# Patient Record
Sex: FEMALE | Race: OTHER | Employment: OTHER | URBAN - METROPOLITAN AREA
[De-identification: names, ages, dates, MRNs, and addresses within clinical notes are randomized per-mention and may not be internally consistent; named-entity substitution may affect disease eponyms.]

---

## 2023-06-14 ENCOUNTER — TELEPHONE (OUTPATIENT)
Dept: PRIMARY CARE | Facility: CLINIC | Age: 73
End: 2023-06-14
Payer: COMMERCIAL

## 2023-06-14 DIAGNOSIS — E66.8 MODERATE OBESITY: ICD-10-CM

## 2023-06-15 ENCOUNTER — APPOINTMENT (OUTPATIENT)
Dept: PRIMARY CARE | Facility: CLINIC | Age: 73
End: 2023-06-15
Payer: COMMERCIAL

## 2023-06-19 ENCOUNTER — LAB (OUTPATIENT)
Dept: LAB | Facility: LAB | Age: 73
End: 2023-06-19
Payer: COMMERCIAL

## 2023-06-19 ENCOUNTER — OFFICE VISIT (OUTPATIENT)
Dept: PRIMARY CARE | Facility: CLINIC | Age: 73
End: 2023-06-19
Payer: COMMERCIAL

## 2023-06-19 VITALS — TEMPERATURE: 97.7 F | BODY MASS INDEX: 47.71 KG/M2 | HEIGHT: 60 IN | WEIGHT: 243 LBS

## 2023-06-19 DIAGNOSIS — E03.9 HYPOTHYROIDISM (ACQUIRED): ICD-10-CM

## 2023-06-19 DIAGNOSIS — L03.119 CELLULITIS OF LOWER EXTREMITY, UNSPECIFIED LATERALITY: ICD-10-CM

## 2023-06-19 DIAGNOSIS — E55.9 VITAMIN D DEFICIENCY: ICD-10-CM

## 2023-06-19 DIAGNOSIS — I10 BENIGN ESSENTIAL HYPERTENSION: ICD-10-CM

## 2023-06-19 DIAGNOSIS — M17.0 PRIMARY OSTEOARTHRITIS OF BOTH KNEES: ICD-10-CM

## 2023-06-19 DIAGNOSIS — I80.9 PHLEBITIS: Primary | ICD-10-CM

## 2023-06-19 DIAGNOSIS — E66.8 MODERATE OBESITY: ICD-10-CM

## 2023-06-19 DIAGNOSIS — M17.11 ARTHRITIS OF RIGHT KNEE: ICD-10-CM

## 2023-06-19 DIAGNOSIS — Z00.00 MEDICARE ANNUAL WELLNESS VISIT, SUBSEQUENT: ICD-10-CM

## 2023-06-19 PROBLEM — E66.9 MODERATE OBESITY: Status: ACTIVE | Noted: 2023-06-19

## 2023-06-19 PROBLEM — E78.5 HYPERLIPIDEMIA: Status: ACTIVE | Noted: 2023-06-19

## 2023-06-19 PROBLEM — G62.9 PERIPHERAL NEUROPATHY: Status: ACTIVE | Noted: 2023-06-19

## 2023-06-19 PROBLEM — L30.9 ECZEMA: Status: ACTIVE | Noted: 2023-06-19

## 2023-06-19 LAB
ALBUMIN (MG/L) IN URINE: 45.2 MG/L
ALBUMIN/CREATININE (UG/MG) IN URINE: 32.3 UG/MG CRT (ref 0–30)
BASOPHILS (10*3/UL) IN BLOOD BY AUTOMATED COUNT: 0.06 X10E9/L (ref 0–0.1)
BASOPHILS/100 LEUKOCYTES IN BLOOD BY AUTOMATED COUNT: 0.7 % (ref 0–2)
CREATININE (MG/DL) IN URINE: 140 MG/DL (ref 20–320)
EOSINOPHILS (10*3/UL) IN BLOOD BY AUTOMATED COUNT: 0.14 X10E9/L (ref 0–0.4)
EOSINOPHILS/100 LEUKOCYTES IN BLOOD BY AUTOMATED COUNT: 1.7 % (ref 0–6)
ERYTHROCYTE DISTRIBUTION WIDTH (RATIO) BY AUTOMATED COUNT: 12.3 % (ref 11.5–14.5)
ERYTHROCYTE MEAN CORPUSCULAR HEMOGLOBIN CONCENTRATION (G/DL) BY AUTOMATED: 30.2 G/DL (ref 32–36)
ERYTHROCYTE MEAN CORPUSCULAR VOLUME (FL) BY AUTOMATED COUNT: 98 FL (ref 80–100)
ERYTHROCYTES (10*6/UL) IN BLOOD BY AUTOMATED COUNT: 4.16 X10E12/L (ref 4–5.2)
ESTIMATED AVERAGE GLUCOSE FOR HBA1C: 97 MG/DL
HEMATOCRIT (%) IN BLOOD BY AUTOMATED COUNT: 40.7 % (ref 36–46)
HEMOGLOBIN (G/DL) IN BLOOD: 12.3 G/DL (ref 12–16)
HEMOGLOBIN A1C/HEMOGLOBIN TOTAL IN BLOOD: 5 %
IMMATURE GRANULOCYTES/100 LEUKOCYTES IN BLOOD BY AUTOMATED COUNT: 0.5 % (ref 0–0.9)
LEUKOCYTES (10*3/UL) IN BLOOD BY AUTOMATED COUNT: 8.1 X10E9/L (ref 4.4–11.3)
LYMPHOCYTES (10*3/UL) IN BLOOD BY AUTOMATED COUNT: 2.55 X10E9/L (ref 0.8–3)
LYMPHOCYTES/100 LEUKOCYTES IN BLOOD BY AUTOMATED COUNT: 31.6 % (ref 13–44)
MONOCYTES (10*3/UL) IN BLOOD BY AUTOMATED COUNT: 0.73 X10E9/L (ref 0.05–0.8)
MONOCYTES/100 LEUKOCYTES IN BLOOD BY AUTOMATED COUNT: 9 % (ref 2–10)
NEUTROPHILS (10*3/UL) IN BLOOD BY AUTOMATED COUNT: 4.56 X10E9/L (ref 1.6–5.5)
NEUTROPHILS/100 LEUKOCYTES IN BLOOD BY AUTOMATED COUNT: 56.5 % (ref 40–80)
NRBC (PER 100 WBCS) BY AUTOMATED COUNT: 0 /100 WBC (ref 0–0)
PLATELETS (10*3/UL) IN BLOOD AUTOMATED COUNT: 289 X10E9/L (ref 150–450)

## 2023-06-19 PROCEDURE — 1036F TOBACCO NON-USER: CPT | Performed by: INTERNAL MEDICINE

## 2023-06-19 PROCEDURE — 80061 LIPID PANEL: CPT

## 2023-06-19 PROCEDURE — 82570 ASSAY OF URINE CREATININE: CPT

## 2023-06-19 PROCEDURE — 85025 COMPLETE CBC W/AUTO DIFF WBC: CPT

## 2023-06-19 PROCEDURE — 99214 OFFICE O/P EST MOD 30 MIN: CPT | Performed by: INTERNAL MEDICINE

## 2023-06-19 PROCEDURE — 80053 COMPREHEN METABOLIC PANEL: CPT

## 2023-06-19 PROCEDURE — 1159F MED LIST DOCD IN RCRD: CPT | Performed by: INTERNAL MEDICINE

## 2023-06-19 PROCEDURE — 84443 ASSAY THYROID STIM HORMONE: CPT

## 2023-06-19 PROCEDURE — 82043 UR ALBUMIN QUANTITATIVE: CPT

## 2023-06-19 PROCEDURE — 1160F RVW MEDS BY RX/DR IN RCRD: CPT | Performed by: INTERNAL MEDICINE

## 2023-06-19 PROCEDURE — 83036 HEMOGLOBIN GLYCOSYLATED A1C: CPT

## 2023-06-19 PROCEDURE — 36415 COLL VENOUS BLD VENIPUNCTURE: CPT

## 2023-06-19 RX ORDER — FLUOCINONIDE 0.5 MG/G
OINTMENT TOPICAL 2 TIMES DAILY PRN
Qty: 60 G | Refills: 0 | Status: SHIPPED | OUTPATIENT
Start: 2023-06-19 | End: 2023-07-18 | Stop reason: ALTCHOICE

## 2023-06-19 RX ORDER — CEPHALEXIN 500 MG/1
500 CAPSULE ORAL 2 TIMES DAILY
Qty: 14 CAPSULE | Refills: 0 | Status: SHIPPED | OUTPATIENT
Start: 2023-06-19 | End: 2023-06-26

## 2023-06-19 RX ORDER — MELOXICAM 15 MG/1
15 TABLET ORAL DAILY
Qty: 30 TABLET | Refills: 11 | Status: SHIPPED | OUTPATIENT
Start: 2023-06-19 | End: 2023-07-18 | Stop reason: ALTCHOICE

## 2023-06-19 NOTE — PROGRESS NOTES
Subjective   Patient ID: Anna Freedmna is a 72 y.o. female who presents for Knee Pain (Right knee pain for the last few weeks ) and Shortness of Breath.    Assessment/Plan     Problem List Items Addressed This Visit          Circulatory    Benign essential hypertension     Patients BP readings reviewed and addressed, as we age our arteries turn stiffer and less elastic. Restricting salt consumption and staying physically fit with regular exercise regimen is the only way to keep our vasculature less tonic. Studies have shown that keeping ideal body wt, exercise routine about 140 to 150 minutes a week, eating variety of plant based diet and drinking plentiful water are quite helpful. Monitor BP twice or once a week at home and bring log to be reviewed by me. Uncontrolled BP has long term consequences including heart failure, myocardial infarction, accelerated atherosclerosis and kidney dysfunction. Therapy reviewed and explained.           Relevant Orders    Albumin , Urine Random    CBC and Auto Differential    Comprehensive Metabolic Panel    Hemoglobin A1C    Lipid Panel    TSH with reflex to Free T4 if abnormal       Musculoskeletal    Arthritis of right knee     Sent for the x-ray physical therapy Occupational Therapy follow-up         Cellulitis of lower extremity    Relevant Medications    fluocinonide (Lidex) 0.05 % ointment    cephalexin (Keflex) 500 mg capsule    meloxicam (Mobic) 15 mg tablet    Primary osteoarthritis of both knees    Relevant Medications    fluocinonide (Lidex) 0.05 % ointment    cephalexin (Keflex) 500 mg capsule    meloxicam (Mobic) 15 mg tablet       Endocrine/Metabolic    Hypothyroidism (acquired)     Endocrine work-up follow-up         Relevant Orders    Albumin , Urine Random    CBC and Auto Differential    Comprehensive Metabolic Panel    Hemoglobin A1C    Lipid Panel    TSH with reflex to Free T4 if abnormal    Moderate obesity     I spent <15 minutes face to face with individual  providing recommendations for nutrition choices and exercise plan to help achieve weight reduction goals. Obesity is systemic disorder and it can bring devastating morbidities in furture. It is a matter of calorie gain and loss, keeping bodybank in negative calorie balance mode is the way to sustain weight loss.Diet has a big role in reducing excess body wt. Scheduled and well planned meals and food intake with watchfulness and understanding of calorie portion and distribution is key to understand. Bariatric surgery is another option if sustained wt loss is not achieved and faced with one or more comorbidities with morbid obesity. Weigh yourself twice a week to understand and follow wt loss goals.           Vitamin D deficiency    Relevant Orders    Albumin , Urine Random    CBC and Auto Differential    Comprehensive Metabolic Panel    Hemoglobin A1C    Lipid Panel    TSH with reflex to Free T4 if abnormal       Infectious/Inflammatory    Phlebitis - Primary     Phlebitis cellulitis given patient Keflex lido skin locally Dyazide referred to the vein clinic         Relevant Medications    fluocinonide (Lidex) 0.05 % ointment    cephalexin (Keflex) 500 mg capsule    meloxicam (Mobic) 15 mg tablet       HPI 72-year-old patient who had a history of obesity hypertension hyperlipidemia metabolic syndrome recently came from [place] complaining of the lower extremity edema phlebitis cellulitis onset acutely duration 1 week progressed slowly aggravating factor underlying immunocompromised host complaint acute on chronic both knee pain more on the right compared to left    Negative for headache chest pain hematuria rectal bleeding.    No Known Allergies    Current Outpatient Medications   Medication Sig Dispense Refill    cephalexin (Keflex) 500 mg capsule Take 1 capsule (500 mg) by mouth 2 times a day for 7 days. 14 capsule 0    fluocinonide (Lidex) 0.05 % ointment Apply topically 2 times a day as needed for irritation or  rash. Avoid face and groin. 60 g 0    meloxicam (Mobic) 15 mg tablet Take 1 tablet (15 mg) by mouth once daily. 30 tablet 11     No current facility-administered medications for this visit.       Objective   Visit Vitals  Temp 36.5 °C (97.7 °F)   Ht 1.524 m (5')   Wt 110 kg (243 lb)   BMI 47.46 kg/m²   Smoking Status Never   BSA 2.16 m²     Physical Exam  Constitutional:       General: He is not in acute distress.     Appearance: Normal appearance.  Obesity  HENT:      Head: Normocephalic and atraumatic.      Nose: Nose normal.   Eyes:      Extraocular Movements: Extraocular movements intact.      Conjunctiva/sclera: Conjunctivae normal.   Cardiovascular:      Rate and Rhythm: Normal rate and regular rhythm.   Pulmonary:      Effort: Pulmonary effort is normal.  Crackles rhonchi     Breath sounds: Normal breath sounds.   Skin:     General: Skin is warm.  Phlebitis cellulitis lower extremity more on the right compared to left  Neurological:      Mental Status: He is alert and oriented to person, place, and time.  Neuralgia  Psychiatric:         Mood and Affect: Mood normal.         Behavior: Behavior normal.  Per DSM 5 (2013): Major Depressive Disorder 5+ symptoms must be present consistently nearly every day in a 2 week period and illustrate a deviation from baseline:      Depressed mood most of the day*    Markedly diminished interest or pleasure in all/nearly all activities most days*    Significant unintended weight loss (or weight gain)    Insomnia or hypersomnia    Psychomotor agitation or retardation    Fatigue or energy loss    Feeling of worthlessness or excessive or inappropriate guilt    Diminished ability to think, concentrate or indecisiveness    Recurrent thoughts of death, suicidal ideation without a specific plan, suicide attempt, or specific suicidal plan     *One of the symptoms must be depressed mood or loss of interest/pleasure.   Suicidal Ideation and suicidal behaviors    If concern for suicidal  risk refer to mental health professional and arrange emergency transport if indicated    If concern for bipolar disorder, refer to mental health professional      PHQ9 Score- Compare and contrast previous PHQ9 scores at each visit to previous PHQ9    It is important to observe a trend in the decrease of the score    To calculate % Change PHQ9 total score from baseline to current visit: (Baseline score minus current score)/ divided by baseline score    Pay attention to question 9 for suicidal risk      Symptoms of depression      Change from patientbaseline     Side Effect Tolerability      Patient Feedback      Anticholinergic    Drowsiness    Insomnia/agitation    Orthostatic hypotension    QTc Prolongation    Gastrointestinal toxicity    Weight gain    Sexual dysfunction     If remission is achieved continue the same treatment including the same medication(s) dose for 6-12 months from the first major depressive episode to prevent relapse.    Exercise    Sleep hygiene    Healthy diet   Stress management   Light therapy for seasonal affective disorder    Meditation/Mindfulness   Continue current medication if no better advised to get a psych evaluation psychotherapies and psychiatric M.D. peripheral name and number was given to patient  Advised to follow-up every 3-4 months and check PHQ  GOAL= improve quality of life /PHQ LESS THAN 4    Musculoskeletal system advanced arthritis of the both knee  There is no immunization history on file for this patient.    Review of Systems    No visits with results within 4 Month(s) from this visit.   Latest known visit with results is:   [facility] Encounter on 11/27/2019   Component Date Value Ref Range Status    TSH 11/27/2019 1.83  0.44 - 3.98 mIU/L Final    Vitamin D, 25-Hydroxy 11/27/2019 20 (A)  ng/mL Final    Cholesterol 11/27/2019 153  0 - 199 mg/dL Final    HDL 11/27/2019 72.6  mg/dL Final    Cholesterol/HDL Ratio 11/27/2019 2.1   Final    LDL 11/27/2019 64  0 - 99 mg/dL  Final    VLDL 11/27/2019 16  0 - 40 mg/dL Final    Triglycerides 11/27/2019 80  0 - 149 mg/dL Final       Radiology: Reviewed imaging in powerchart.  No results found.    Family History   Problem Relation Name Age of Onset    No Known Problems Mother      No Known Problems Father       Social History     Socioeconomic History    Marital status:      Spouse name: None    Number of children: None    Years of education: None    Highest education level: None   Occupational History    None   Tobacco Use    Smoking status: Never    Smokeless tobacco: Never   Substance and Sexual Activity    Alcohol use: Never    Drug use: Never    Sexual activity: None   Other Topics Concern    None   Social History Narrative    None     Social Determinants of Health     Financial Resource Strain: Not on file   Food Insecurity: Not on file   Transportation Needs: Not on file   Physical Activity: Not on file   Stress: Not on file   Social Connections: Not on file   Intimate Partner Violence: Not on file   Housing Stability: Not on file     History reviewed. No pertinent past medical history.  Past Surgical History:   Procedure Laterality Date    TOTAL KNEE ARTHROPLASTY  03/22/2016    Knee Replacement       Charting was completed using voice recognition technology and may include unintended errors.

## 2023-06-20 LAB
ALANINE AMINOTRANSFERASE (SGPT) (U/L) IN SER/PLAS: 4 U/L (ref 7–45)
ALBUMIN (G/DL) IN SER/PLAS: 4.4 G/DL (ref 3.4–5)
ALKALINE PHOSPHATASE (U/L) IN SER/PLAS: 68 U/L (ref 33–136)
ANION GAP IN SER/PLAS: 13 MMOL/L (ref 10–20)
ASPARTATE AMINOTRANSFERASE (SGOT) (U/L) IN SER/PLAS: 12 U/L (ref 9–39)
BILIRUBIN TOTAL (MG/DL) IN SER/PLAS: 0.5 MG/DL (ref 0–1.2)
CALCIUM (MG/DL) IN SER/PLAS: 9.9 MG/DL (ref 8.6–10.6)
CARBON DIOXIDE, TOTAL (MMOL/L) IN SER/PLAS: 29 MMOL/L (ref 21–32)
CHLORIDE (MMOL/L) IN SER/PLAS: 105 MMOL/L (ref 98–107)
CHOLESTEROL (MG/DL) IN SER/PLAS: 198 MG/DL (ref 0–199)
CHOLESTEROL IN HDL (MG/DL) IN SER/PLAS: 62.2 MG/DL
CHOLESTEROL/HDL RATIO: 3.2
CREATININE (MG/DL) IN SER/PLAS: 0.99 MG/DL (ref 0.5–1.05)
GFR FEMALE: 60 ML/MIN/1.73M2
GLUCOSE (MG/DL) IN SER/PLAS: 70 MG/DL (ref 74–99)
LDL: 116 MG/DL (ref 0–99)
POTASSIUM (MMOL/L) IN SER/PLAS: 4.8 MMOL/L (ref 3.5–5.3)
PROTEIN TOTAL: 7.5 G/DL (ref 6.4–8.2)
SODIUM (MMOL/L) IN SER/PLAS: 142 MMOL/L (ref 136–145)
THYROTROPIN (MIU/L) IN SER/PLAS BY DETECTION LIMIT <= 0.05 MIU/L: 3.84 MIU/L (ref 0.44–3.98)
TRIGLYCERIDE (MG/DL) IN SER/PLAS: 101 MG/DL (ref 0–149)
UREA NITROGEN (MG/DL) IN SER/PLAS: 18 MG/DL (ref 6–23)
VLDL: 20 MG/DL (ref 0–40)

## 2023-06-20 NOTE — TELEPHONE ENCOUNTER
----- Message from Mari Gooden MD sent at 6/20/2023  9:18 AM EDT -----  Adipex 1 a day #30 follow-up 4 weeks 1800-calorie diet

## 2023-06-23 ENCOUNTER — TELEPHONE (OUTPATIENT)
Dept: PRIMARY CARE | Facility: CLINIC | Age: 73
End: 2023-06-23
Payer: COMMERCIAL

## 2023-06-23 RX ORDER — PHENTERMINE HYDROCHLORIDE 37.5 MG/1
37.5 TABLET ORAL
Qty: 30 TABLET | Refills: 0 | Status: SHIPPED | OUTPATIENT
Start: 2023-06-23 | End: 2023-07-18 | Stop reason: ALTCHOICE

## 2023-06-23 NOTE — TELEPHONE ENCOUNTER
----- Message from Mari Gooden MD sent at 6/23/2023 10:41 AM EDT -----  High LDL low blood sugar advised follow-up next week bring all medication with you

## 2023-06-26 NOTE — TELEPHONE ENCOUNTER
----- Message from Mari Gooden MD sent at 6/26/2023  2:05 PM EDT -----  Severe arthritis advised to see Dr. Dougie Bauman for possible surgery

## 2023-07-18 ENCOUNTER — OFFICE VISIT (OUTPATIENT)
Dept: PRIMARY CARE | Facility: CLINIC | Age: 73
End: 2023-07-18
Payer: COMMERCIAL

## 2023-07-18 ENCOUNTER — LAB (OUTPATIENT)
Dept: LAB | Facility: LAB | Age: 73
End: 2023-07-18
Payer: COMMERCIAL

## 2023-07-18 VITALS
OXYGEN SATURATION: 96 % | HEART RATE: 72 BPM | TEMPERATURE: 97.5 F | BODY MASS INDEX: 47.46 KG/M2 | DIASTOLIC BLOOD PRESSURE: 81 MMHG | SYSTOLIC BLOOD PRESSURE: 131 MMHG | HEIGHT: 60 IN

## 2023-07-18 DIAGNOSIS — E55.9 VITAMIN D DEFICIENCY: ICD-10-CM

## 2023-07-18 DIAGNOSIS — E66.8 MODERATE OBESITY: ICD-10-CM

## 2023-07-18 DIAGNOSIS — M81.0 AGE-RELATED OSTEOPOROSIS WITHOUT CURRENT PATHOLOGICAL FRACTURE: ICD-10-CM

## 2023-07-18 DIAGNOSIS — I10 BENIGN ESSENTIAL HYPERTENSION: ICD-10-CM

## 2023-07-18 DIAGNOSIS — E78.2 MIXED HYPERLIPIDEMIA: ICD-10-CM

## 2023-07-18 DIAGNOSIS — M25.561 CHRONIC PAIN OF RIGHT KNEE: ICD-10-CM

## 2023-07-18 DIAGNOSIS — E03.9 HYPOTHYROIDISM (ACQUIRED): ICD-10-CM

## 2023-07-18 DIAGNOSIS — G89.29 CHRONIC PAIN OF RIGHT KNEE: ICD-10-CM

## 2023-07-18 DIAGNOSIS — I10 BENIGN ESSENTIAL HYPERTENSION: Primary | ICD-10-CM

## 2023-07-18 DIAGNOSIS — M25.561 RIGHT KNEE PAIN, UNSPECIFIED CHRONICITY: ICD-10-CM

## 2023-07-18 LAB
ALANINE AMINOTRANSFERASE (SGPT) (U/L) IN SER/PLAS: 5 U/L (ref 7–45)
ALBUMIN (G/DL) IN SER/PLAS: 4.3 G/DL (ref 3.4–5)
ALKALINE PHOSPHATASE (U/L) IN SER/PLAS: 70 U/L (ref 33–136)
ANION GAP IN SER/PLAS: 11 MMOL/L (ref 10–20)
ASPARTATE AMINOTRANSFERASE (SGOT) (U/L) IN SER/PLAS: 15 U/L (ref 9–39)
BILIRUBIN TOTAL (MG/DL) IN SER/PLAS: 0.4 MG/DL (ref 0–1.2)
CALCIUM (MG/DL) IN SER/PLAS: 9.7 MG/DL (ref 8.6–10.6)
CARBON DIOXIDE, TOTAL (MMOL/L) IN SER/PLAS: 29 MMOL/L (ref 21–32)
CHLORIDE (MMOL/L) IN SER/PLAS: 102 MMOL/L (ref 98–107)
CREATININE (MG/DL) IN SER/PLAS: 1.04 MG/DL (ref 0.5–1.05)
GFR FEMALE: 56 ML/MIN/1.73M2
GLUCOSE (MG/DL) IN SER/PLAS: 115 MG/DL (ref 74–99)
POTASSIUM (MMOL/L) IN SER/PLAS: 5.3 MMOL/L (ref 3.5–5.3)
PROTEIN TOTAL: 7 G/DL (ref 6.4–8.2)
SODIUM (MMOL/L) IN SER/PLAS: 137 MMOL/L (ref 136–145)
UREA NITROGEN (MG/DL) IN SER/PLAS: 18 MG/DL (ref 6–23)

## 2023-07-18 PROCEDURE — 3075F SYST BP GE 130 - 139MM HG: CPT | Performed by: INTERNAL MEDICINE

## 2023-07-18 PROCEDURE — 1036F TOBACCO NON-USER: CPT | Performed by: INTERNAL MEDICINE

## 2023-07-18 PROCEDURE — 80053 COMPREHEN METABOLIC PANEL: CPT

## 2023-07-18 PROCEDURE — 1160F RVW MEDS BY RX/DR IN RCRD: CPT | Performed by: INTERNAL MEDICINE

## 2023-07-18 PROCEDURE — 20610 DRAIN/INJ JOINT/BURSA W/O US: CPT | Performed by: INTERNAL MEDICINE

## 2023-07-18 PROCEDURE — 3079F DIAST BP 80-89 MM HG: CPT | Performed by: INTERNAL MEDICINE

## 2023-07-18 PROCEDURE — 99213 OFFICE O/P EST LOW 20 MIN: CPT | Performed by: INTERNAL MEDICINE

## 2023-07-18 PROCEDURE — 1159F MED LIST DOCD IN RCRD: CPT | Performed by: INTERNAL MEDICINE

## 2023-07-18 PROCEDURE — 36415 COLL VENOUS BLD VENIPUNCTURE: CPT

## 2023-07-18 RX ORDER — TRIAMCINOLONE ACETONIDE 40 MG/ML
60 INJECTION, SUSPENSION INTRA-ARTICULAR; INTRAMUSCULAR ONCE
Status: COMPLETED | OUTPATIENT
Start: 2023-07-18 | End: 2023-07-18

## 2023-07-18 RX ORDER — PHENTERMINE HYDROCHLORIDE 37.5 MG/1
37.5 TABLET ORAL
Qty: 30 TABLET | Refills: 0 | Status: SHIPPED | OUTPATIENT
Start: 2023-07-18 | End: 2023-08-11 | Stop reason: ALTCHOICE

## 2023-07-18 RX ADMIN — TRIAMCINOLONE ACETONIDE 60 MG: 40 INJECTION, SUSPENSION INTRA-ARTICULAR; INTRAMUSCULAR at 16:16

## 2023-07-18 ASSESSMENT — PATIENT HEALTH QUESTIONNAIRE - PHQ9
2. FEELING DOWN, DEPRESSED OR HOPELESS: NOT AT ALL
1. LITTLE INTEREST OR PLEASURE IN DOING THINGS: NOT AT ALL
SUM OF ALL RESPONSES TO PHQ9 QUESTIONS 1 AND 2: 0

## 2023-07-18 NOTE — PROGRESS NOTES
Subjective   Patient ID: Anna Freedman is a 72 y.o. female who presents for Annual Exam.    Assessment/Plan     Problem List Items Addressed This Visit          Cardiac and Vasculature    Benign essential hypertension - Primary    Relevant Orders    Comprehensive Metabolic Panel    Hyperlipidemia       Endocrine/Metabolic    Hypothyroidism (acquired)    Moderate obesity     I personally reviewed the OARRS report for this patient. This report is scanned into the electronic medical record. I have considered  the risks of abuse, dependence, addiction and diversion. After discussion, patient does have a good understanding of the safety and  risks of this medication. I believe that it is clinically appropriate for this patient to be prescribed this medication.  See patient back in 6 weeks.         Relevant Medications    phentermine (Adipex-P) 37.5 mg tablet    Vitamin D deficiency    Age-related osteoporosis without current pathological fracture     Vitamin C vitamin D Prolia oh            Musculoskeletal and Injuries    Chronic pain of right knee     Advanced osteoarthritis bursitis phlebitis given Kenalog 60 intra articular injection physical therapy Occupational Therapy nonsurgical treatment          Other Visit Diagnoses       Right knee pain, unspecified chronicity        Relevant Medications    triamcinolone acetonide (Kenalog-40) injection 60 mg (Completed) (Start on 7/18/2023  4:30 PM)        Patient was evaluated today, problem list was reviewed, problems and concerns addressed, Rx list reviewed and updated, lab and tests were noted and reviewed. Life style changes were discussed, always it works better if we eat plant based diet and plenty of fibres and roughage. Consume adequate amount of water and avoid alcohol, light to moderate physical activities and stress reduction are always beneficial for ongoing physical well being. Do not forget to have 6 to 7 hours of sleep regularly and avoid late night christopher  screen exposure.      HPI 72-year-old patient who had a history of hypertension hyperlipidemia hypothyroidism vitamin D deficiency advanced osteoarthritis also had a varicose vein in lower extremity seen by the vascular clinic complaining arthralgia myalgia fatigue tired weakness snoring at night    Advanced osteoarthritis bursitis phlebitis right lower extremity given Kenalog injections and also physical Occupational Therapy vitamin C vitamin D calcium supplement patient of osteoporosis advised to get Prolia which order CMP    No Known Allergies    Current Outpatient Medications   Medication Sig Dispense Refill    phentermine (Adipex-P) 37.5 mg tablet Take 1 tablet (37.5 mg) by mouth once daily in the morning. Take before meals. 30 tablet 0     No current facility-administered medications for this visit.       Objective   Visit Vitals  /81 (BP Location: Right arm, Patient Position: Sitting, BP Cuff Size: Large adult)   Pulse 72   Temp 36.4 °C (97.5 °F)   Ht 1.524 m (5')   SpO2 96%   BMI 47.46 kg/m²   Smoking Status Never   BSA 2.16 m²     Physical Exam  Constitutional:       General: He is not in acute distress.     Appearance: Normal appearance.  Severe obesity  HENT:      Head: Normocephalic and atraumatic.      Nose: Nose normal.   Eyes:      Extraocular Movements: Extraocular movements intact.      Conjunctiva/sclera: Conjunctivae normal.   Cardiovascular:      Rate and Rhythm: Normal rate and regular rhythm.  Heart murmur 2+ ankle edema  Pulmonary:      Effort: Pulmonary effort is normal.      Breath sounds: Normal breath sounds.   Skin:     General: Skin is warm.  Phlebitis  Neurological:      Mental Status: He is alert and oriented to person, place, and time.   Psychiatric:         Mood and Affect: Mood normal.         Behavior: Behavior normal.   Musculoskeletal right knee tenderness left hip tenderness  Immunization History   Administered Date(s) Administered    Influenza, High Dose Seasonal,  Preservative Free 09/30/2015, 10/05/2016    Influenza, seasonal, injectable 09/24/2014    Pneumococcal Conjugate PCV 13 09/30/2015    Pneumococcal Polysaccharide PPSV23 12/20/2014    Td (adult), 5 Lf tetanus toxoid, preservative free, adsorbed 04/04/2016       Review of Systems    Lab on 06/19/2023   Component Date Value Ref Range Status    ALBUMIN (MG/L) IN URINE 06/19/2023 45.2  Not Established mg/L Final    Albumin/Creatine Ratio 06/19/2023 32.3 (H)  0.0 - 30.0 ug/mg crt Final    Creatinine, Urine 06/19/2023 140.0  20.0 - 320.0 mg/dL Final    WBC 06/19/2023 8.1  4.4 - 11.3 x10E9/L Final    nRBC 06/19/2023 0.0  0.0 - 0.0 /100 WBC Final    RBC 06/19/2023 4.16  4.00 - 5.20 x10E12/L Final    Hemoglobin 06/19/2023 12.3  12.0 - 16.0 g/dL Final    Hematocrit 06/19/2023 40.7  36.0 - 46.0 % Final    MCV 06/19/2023 98  80 - 100 fL Final    MCHC 06/19/2023 30.2 (L)  32.0 - 36.0 g/dL Final    Platelets 06/19/2023 289  150 - 450 x10E9/L Final    RDW 06/19/2023 12.3  11.5 - 14.5 % Final    Neutrophils % 06/19/2023 56.5  40.0 - 80.0 % Final    Immature Granulocytes %, Automated 06/19/2023 0.5  0.0 - 0.9 % Final    Lymphocytes % 06/19/2023 31.6  13.0 - 44.0 % Final    Monocytes % 06/19/2023 9.0  2.0 - 10.0 % Final    Eosinophils % 06/19/2023 1.7  0.0 - 6.0 % Final    Basophils % 06/19/2023 0.7  0.0 - 2.0 % Final    Neutrophils Absolute 06/19/2023 4.56  1.60 - 5.50 x10E9/L Final    Lymphocytes Absolute 06/19/2023 2.55  0.80 - 3.00 x10E9/L Final    Monocytes Absolute 06/19/2023 0.73  0.05 - 0.80 x10E9/L Final    Eosinophils Absolute 06/19/2023 0.14  0.00 - 0.40 x10E9/L Final    Basophils Absolute 06/19/2023 0.06  0.00 - 0.10 x10E9/L Final    Hemoglobin A1C 06/19/2023 5.0  % Final    Estimated Average Glucose 06/19/2023 97  MG/DL Final    TSH 06/19/2023 3.84  0.44 - 3.98 mIU/L Final    Glucose 06/19/2023 70 (L)  74 - 99 mg/dL Final    Sodium 06/19/2023 142  136 - 145 mmol/L Final    Potassium 06/19/2023 4.8  3.5 - 5.3 mmol/L Final     Chloride 06/19/2023 105  98 - 107 mmol/L Final    Bicarbonate 06/19/2023 29  21 - 32 mmol/L Final    Anion Gap 06/19/2023 13  10 - 20 mmol/L Final    Urea Nitrogen 06/19/2023 18  6 - 23 mg/dL Final    Creatinine 06/19/2023 0.99  0.50 - 1.05 mg/dL Final    GFR Female 06/19/2023 60  >90 mL/min/1.73m2 Final    Calcium 06/19/2023 9.9  8.6 - 10.6 mg/dL Final    Albumin 06/19/2023 4.4  3.4 - 5.0 g/dL Final    Alkaline Phosphatase 06/19/2023 68  33 - 136 U/L Final    Total Protein 06/19/2023 7.5  6.4 - 8.2 g/dL Final    AST 06/19/2023 12  9 - 39 U/L Final    Total Bilirubin 06/19/2023 0.5  0.0 - 1.2 mg/dL Final    ALT (SGPT) 06/19/2023 4 (L)  7 - 45 U/L Final    Cholesterol 06/19/2023 198  0 - 199 mg/dL Final    HDL 06/19/2023 62.2  mg/dL Final    Cholesterol/HDL Ratio 06/19/2023 3.2   Final    LDL 06/19/2023 116 (H)  0 - 99 mg/dL Final    VLDL 06/19/2023 20  0 - 40 mg/dL Final    Triglycerides 06/19/2023 101  0 - 149 mg/dL Final       Radiology: Reviewed imaging in powerchart.  XR knee right 3 views    Result Date: 6/19/2023  Xrays right knee 3 views CLINICAL HISTORY: Pain, no trauma, ARTHRITIS, Comparison: None Findings: No acute fracture, dislocation, lytic process or periosteal reaction is seen in the visualized bones. No erosive type of arthritis is seen. There is no pathologic soft tissue calcification.  There is tricompartment marginal spurring and severe narrowing of the medial femorotibial and patellofemoral compartments. At least mild varus angulation at the knee. No significant joint effusion. IMPRESSION: No acute skeletal abnormality. Severe osteoarthritis. Electronically signed by:  Felice Matt MD  6/25/2023 11:25 PM CDT Workstation: [id number] Technologist:  [id number] Dictated By:   FELICE MATT MD Signed By:     FELICE MATT MD Signed Out:    06/26/23 00:25:34      Family History   Problem Relation Name Age of Onset    No Known Problems Mother      No Known Problems Father       Social History      Socioeconomic History    Marital status:      Spouse name: None    Number of children: None    Years of education: None    Highest education level: None   Occupational History    None   Tobacco Use    Smoking status: Never    Smokeless tobacco: Never   Substance and Sexual Activity    Alcohol use: Never    Drug use: Never    Sexual activity: None   Other Topics Concern    None   Social History Narrative    None     Social Determinants of Health     Financial Resource Strain: Not on file   Food Insecurity: Not on file   Transportation Needs: Not on file   Physical Activity: Not on file   Stress: Not on file   Social Connections: Not on file   Intimate Partner Violence: Not on file   Housing Stability: Not on file     History reviewed. No pertinent past medical history.  Past Surgical History:   Procedure Laterality Date    TOTAL KNEE ARTHROPLASTY  03/22/2016    Knee Replacement       Charting was completed using voice recognition technology and may include unintended errors.

## 2023-07-18 NOTE — ASSESSMENT & PLAN NOTE
Advanced osteoarthritis bursitis phlebitis given Kenalog 60 intra articular injection physical therapy Occupational Therapy nonsurgical treatment

## 2023-07-20 ENCOUNTER — TELEPHONE (OUTPATIENT)
Dept: PRIMARY CARE | Facility: CLINIC | Age: 73
End: 2023-07-20
Payer: COMMERCIAL

## 2023-07-20 NOTE — TELEPHONE ENCOUNTER
----- Message from Mari Gooden MD sent at 7/20/2023 10:42 AM EDT -----  Hyperglycemia dehydration advised Ozempic 0.25 mg subcu weekly #4

## 2023-07-20 NOTE — TELEPHONE ENCOUNTER
----- Message from Mari Gooden MD sent at 7/20/2023 11:07 AM EDT -----  CMP    Prolia 60 mg subcu every 6-month

## 2023-07-21 DIAGNOSIS — E66.8 MODERATE OBESITY: ICD-10-CM

## 2023-07-21 RX ORDER — SEMAGLUTIDE 0.68 MG/ML
0.25 INJECTION, SOLUTION SUBCUTANEOUS
Qty: 6 ML | Refills: 2 | Status: SHIPPED | OUTPATIENT
Start: 2023-07-21 | End: 2023-07-26 | Stop reason: SDUPTHER

## 2023-07-21 RX ORDER — PEN NEEDLE, DIABETIC 30 GX3/16"
NEEDLE, DISPOSABLE MISCELLANEOUS
Qty: 100 EACH | Refills: 2 | Status: SHIPPED | OUTPATIENT
Start: 2023-07-21 | End: 2023-08-11 | Stop reason: ALTCHOICE

## 2023-07-26 DIAGNOSIS — E66.8 MODERATE OBESITY: ICD-10-CM

## 2023-07-27 DIAGNOSIS — E11.9 DIABETES MELLITUS WITH COINCIDENT HYPERTENSION (MULTI): Primary | ICD-10-CM

## 2023-07-27 DIAGNOSIS — I10 DIABETES MELLITUS WITH COINCIDENT HYPERTENSION (MULTI): Primary | ICD-10-CM

## 2023-07-27 RX ORDER — SEMAGLUTIDE 0.68 MG/ML
0.25 INJECTION, SOLUTION SUBCUTANEOUS
Qty: 6 ML | Refills: 2 | Status: SHIPPED | OUTPATIENT
Start: 2023-07-27 | End: 2023-08-11 | Stop reason: ALTCHOICE

## 2023-08-11 ENCOUNTER — OFFICE VISIT (OUTPATIENT)
Dept: PRIMARY CARE | Facility: CLINIC | Age: 73
End: 2023-08-11
Payer: COMMERCIAL

## 2023-08-11 VITALS
DIASTOLIC BLOOD PRESSURE: 81 MMHG | SYSTOLIC BLOOD PRESSURE: 142 MMHG | WEIGHT: 233.3 LBS | HEIGHT: 60 IN | OXYGEN SATURATION: 96 % | BODY MASS INDEX: 45.8 KG/M2 | TEMPERATURE: 97.5 F | HEART RATE: 60 BPM

## 2023-08-11 DIAGNOSIS — E66.8 MODERATE OBESITY: ICD-10-CM

## 2023-08-11 DIAGNOSIS — Z12.12 SCREENING FOR RECTAL CANCER: ICD-10-CM

## 2023-08-11 DIAGNOSIS — I50.9 DYSPNEA DUE TO CONGESTIVE HEART FAILURE (MULTI): ICD-10-CM

## 2023-08-11 DIAGNOSIS — E78.2 HYPERLIPEMIA, MIXED: ICD-10-CM

## 2023-08-11 DIAGNOSIS — Z12.11 SCREEN FOR COLON CANCER: ICD-10-CM

## 2023-08-11 DIAGNOSIS — I10 BENIGN ESSENTIAL HYPERTENSION: Primary | ICD-10-CM

## 2023-08-11 PROBLEM — L30.9 ECZEMA: Status: RESOLVED | Noted: 2023-06-19 | Resolved: 2023-08-11

## 2023-08-11 PROBLEM — E78.5 HYPERLIPIDEMIA: Status: RESOLVED | Noted: 2023-06-19 | Resolved: 2023-08-11

## 2023-08-11 PROBLEM — M81.0 AGE-RELATED OSTEOPOROSIS WITHOUT CURRENT PATHOLOGICAL FRACTURE: Status: RESOLVED | Noted: 2023-07-18 | Resolved: 2023-08-11

## 2023-08-11 PROBLEM — M17.11 ARTHRITIS OF RIGHT KNEE: Status: RESOLVED | Noted: 2023-06-19 | Resolved: 2023-08-11

## 2023-08-11 PROBLEM — E03.9 HYPOTHYROIDISM (ACQUIRED): Status: RESOLVED | Noted: 2023-06-19 | Resolved: 2023-08-11

## 2023-08-11 PROBLEM — M17.0 PRIMARY OSTEOARTHRITIS OF BOTH KNEES: Status: RESOLVED | Noted: 2023-06-19 | Resolved: 2023-08-11

## 2023-08-11 PROBLEM — G89.29 CHRONIC PAIN OF RIGHT KNEE: Status: RESOLVED | Noted: 2023-07-18 | Resolved: 2023-08-11

## 2023-08-11 PROBLEM — E55.9 VITAMIN D DEFICIENCY: Status: RESOLVED | Noted: 2023-06-19 | Resolved: 2023-08-11

## 2023-08-11 PROBLEM — L03.119 CELLULITIS OF LOWER EXTREMITY: Status: RESOLVED | Noted: 2023-06-19 | Resolved: 2023-08-11

## 2023-08-11 PROBLEM — M25.561 CHRONIC PAIN OF RIGHT KNEE: Status: RESOLVED | Noted: 2023-07-18 | Resolved: 2023-08-11

## 2023-08-11 PROBLEM — I80.9 PHLEBITIS: Status: RESOLVED | Noted: 2023-06-19 | Resolved: 2023-08-11

## 2023-08-11 PROBLEM — G62.9 PERIPHERAL NEUROPATHY: Status: RESOLVED | Noted: 2023-06-19 | Resolved: 2023-08-11

## 2023-08-11 PROCEDURE — 1160F RVW MEDS BY RX/DR IN RCRD: CPT | Performed by: INTERNAL MEDICINE

## 2023-08-11 PROCEDURE — 1036F TOBACCO NON-USER: CPT | Performed by: INTERNAL MEDICINE

## 2023-08-11 PROCEDURE — 99214 OFFICE O/P EST MOD 30 MIN: CPT | Performed by: INTERNAL MEDICINE

## 2023-08-11 PROCEDURE — 1159F MED LIST DOCD IN RCRD: CPT | Performed by: INTERNAL MEDICINE

## 2023-08-11 PROCEDURE — 3079F DIAST BP 80-89 MM HG: CPT | Performed by: INTERNAL MEDICINE

## 2023-08-11 PROCEDURE — 3077F SYST BP >= 140 MM HG: CPT | Performed by: INTERNAL MEDICINE

## 2023-08-11 RX ORDER — AMLODIPINE BESYLATE 2.5 MG/1
5 TABLET ORAL DAILY
COMMUNITY
Stop reason: DRUGHIGH

## 2023-08-11 NOTE — PROGRESS NOTES
Subjective   Patient ID: Anna Freedman is a 72 y.o. female who presents for Knee Pain (Left- 2 months now).    Assessment/Plan     Problem List Items Addressed This Visit       Benign essential hypertension - Primary     Patients BP readings reviewed and addressed, as we age our arteries turn stiffer and less elastic. Restricting salt consumption and staying physically fit with regular exercise regimen is the only way to keep our vasculature less tonic. Studies have shown that keeping ideal body wt, exercise routine about 140 to 150 minutes a week, eating variety of plant based diet and drinking plentiful water are quite helpful. Monitor BP twice or once a week at home and bring log to be reviewed by me. Uncontrolled BP has long term consequences including heart failure, myocardial infarction, accelerated atherosclerosis and kidney dysfunction. Therapy reviewed and explained.           Hyperlipemia, mixed    Moderate obesity     I spent <15 minutes face to face with individual providing recommendations for nutrition choices and exercise plan to help achieve weight reduction goals. Obesity is systemic disorder and it can bring devastating morbidities in furture. It is a matter of calorie gain and loss, keeping bodybank in negative calorie balance mode is the way to sustain weight loss.Diet has a big role in reducing excess body wt. Scheduled and well planned meals and food intake with watchfulness and understanding of calorie portion and distribution is key to understand. Bariatric surgery is another option if sustained wt loss is not achieved and faced with one or more comorbidities with morbid obesity. Weigh yourself twice a week to understand and follow wt loss goals.           Screen for colon cancer    Relevant Orders    Cologuard® colon cancer screening    Dyspnea due to congestive heart failure (CMS/HCC)     Refer to the cardiologist advised to get stress echo cardiac catheterization controlled BMI blood  pressure LDL cholesterol hemoglobin A1c patient will continue amlodipine olmesartan Coreg aspirin         Relevant Medications    amLODIPine (Norvasc) 2.5 mg tablet    BISOPROLOL FUMARATE ORAL   Patient was evaluated today, problem list was reviewed, problems and concerns addressed, Rx list reviewed and updated, lab and tests were noted and reviewed. Life style changes were discussed, always it works better if we eat plant based diet and plenty of fibres and roughage. Consume adequate amount of water and avoid alcohol, light to moderate physical activities and stress reduction are always beneficial for ongoing physical well being. Do not forget to have 6 to 7 hours of sleep regularly and avoid late night christopher screen exposure.      HPI  \This is a 72-year-old patient who had a history of obesity hypertension hyperlipidemia sleep apnea complaining of the cough congestion dyspnea PND orthopnea onset acutely duration few weeks progressed acutely aggravated by exercise family history positive for heart disease    Patient went to [facility] had a taking medication for blood pressure cholesterol and heart disease taking amlodipine telmisartan Catapres Coreg and aspirin now complaining of dyspnea with cough with hyperglycemia and low GFR advised to get Trelegy 1 puff a day refer patient to cardiologist and pulmonary service for possible spirometry and 2D echo of the heart consider cardiac catheterization if the dizziness continue  History reviewed. No pertinent past medical history.  Past Surgical History:   Procedure Laterality Date    TOTAL KNEE ARTHROPLASTY  03/22/2016    Knee Replacement     No Known Allergies  Current Outpatient Medications   Medication Sig Dispense Refill    amLODIPine (Norvasc) 2.5 mg tablet Take 1 tablet (2.5 mg) by mouth once daily.      BISOPROLOL FUMARATE ORAL Take by mouth.       No current facility-administered medications for this visit.     Family History   Problem Relation Name Age of Onset    No  Known Problems Mother      No Known Problems Father       Social History     Socioeconomic History    Marital status:      Spouse name: None    Number of children: None    Years of education: None    Highest education level: None   Occupational History    None   Tobacco Use    Smoking status: Never    Smokeless tobacco: Never   Substance and Sexual Activity    Alcohol use: Never    Drug use: Never    Sexual activity: None   Other Topics Concern    None   Social History Narrative    None     Social Determinants of Health     Financial Resource Strain: Not on file   Food Insecurity: Not on file   Transportation Needs: Not on file   Physical Activity: Not on file   Stress: Not on file   Social Connections: Not on file   Intimate Partner Violence: Not on file   Housing Stability: Not on file     Immunization History   Administered Date(s) Administered    Influenza, High Dose Seasonal, Preservative Free 09/30/2015, 10/05/2016    Influenza, Unspecified 10/05/2016    Influenza, seasonal, injectable 09/24/2014    Pneumococcal conjugate vaccine, 13-valent (PREVNAR 13) 09/30/2015    Pneumococcal polysaccharide vaccine, 23-valent, age 2 years and older (PNEUMOVAX 23) 12/20/2014    Td vaccine, age 7 years and older (TENIVAC) 04/04/2016       Review of Systems  Review of systems is otherwise negative unless stated above or in history of present illness.    Objective   Visit Vitals  /81 (BP Location: Right arm, Patient Position: Sitting, BP Cuff Size: Large adult)   Pulse 60   Temp 36.4 °C (97.5 °F)   Ht 1.524 m (5')   Wt 106 kg (233 lb 4.8 oz)   SpO2 96%   BMI 45.56 kg/m²   Smoking Status Never   BSA 2.12 m²     Physical Exam  Constitutional:       General: not in acute distress.  Obesity   HENT:      Head: Normocephalic and atraumatic.      Nose: Nose normal.   Eyes:      Extraocular Movements: Extraocular movements intact.      Conjunctiva/sclera: Conjunctivae normal.   Cardiovascular: S1-S2 S4     Pulmonary:  Crackles rales rhonchi       Skin:     General: Skin is warm.   Neurological:      Mental Status: He is alert and oriented to person, place, and time.   Psychiatric:         Mood and Affect: Mood normal.         Behavior: Behavior normal.   Musculoskeletal arthritis of both knee  FROM in all extremitirs,  Joint-no swelling or tenderness    Lab on 07/18/2023   Component Date Value Ref Range Status    Glucose 07/18/2023 115 (H)  74 - 99 mg/dL Final    Sodium 07/18/2023 137  136 - 145 mmol/L Final    Potassium 07/18/2023 5.3  3.5 - 5.3 mmol/L Final    Chloride 07/18/2023 102  98 - 107 mmol/L Final    Bicarbonate 07/18/2023 29  21 - 32 mmol/L Final    Anion Gap 07/18/2023 11  10 - 20 mmol/L Final    Urea Nitrogen 07/18/2023 18  6 - 23 mg/dL Final    Creatinine 07/18/2023 1.04  0.50 - 1.05 mg/dL Final    GFR Female 07/18/2023 56 (A)  >90 mL/min/1.73m2 Final    Calcium 07/18/2023 9.7  8.6 - 10.6 mg/dL Final    Albumin 07/18/2023 4.3  3.4 - 5.0 g/dL Final    Alkaline Phosphatase 07/18/2023 70  33 - 136 U/L Final    Total Protein 07/18/2023 7.0  6.4 - 8.2 g/dL Final    AST 07/18/2023 15  9 - 39 U/L Final    Total Bilirubin 07/18/2023 0.4  0.0 - 1.2 mg/dL Final    ALT (SGPT) 07/18/2023 5 (L)  7 - 45 U/L Final   Lab on 06/19/2023   Component Date Value Ref Range Status    ALBUMIN (MG/L) IN URINE 06/19/2023 45.2  Not Established mg/L Final    Albumin/Creatine Ratio 06/19/2023 32.3 (H)  0.0 - 30.0 ug/mg crt Final    Creatinine, Urine 06/19/2023 140.0  20.0 - 320.0 mg/dL Final    WBC 06/19/2023 8.1  4.4 - 11.3 x10E9/L Final    nRBC 06/19/2023 0.0  0.0 - 0.0 /100 WBC Final    RBC 06/19/2023 4.16  4.00 - 5.20 x10E12/L Final    Hemoglobin 06/19/2023 12.3  12.0 - 16.0 g/dL Final    Hematocrit 06/19/2023 40.7  36.0 - 46.0 % Final    MCV 06/19/2023 98  80 - 100 fL Final    MCHC 06/19/2023 30.2 (L)  32.0 - 36.0 g/dL Final    Platelets 06/19/2023 289  150 - 450 x10E9/L Final    RDW 06/19/2023 12.3  11.5 - 14.5 % Final    Neutrophils %  06/19/2023 56.5  40.0 - 80.0 % Final    Immature Granulocytes %, Automated 06/19/2023 0.5  0.0 - 0.9 % Final    Lymphocytes % 06/19/2023 31.6  13.0 - 44.0 % Final    Monocytes % 06/19/2023 9.0  2.0 - 10.0 % Final    Eosinophils % 06/19/2023 1.7  0.0 - 6.0 % Final    Basophils % 06/19/2023 0.7  0.0 - 2.0 % Final    Neutrophils Absolute 06/19/2023 4.56  1.60 - 5.50 x10E9/L Final    Lymphocytes Absolute 06/19/2023 2.55  0.80 - 3.00 x10E9/L Final    Monocytes Absolute 06/19/2023 0.73  0.05 - 0.80 x10E9/L Final    Eosinophils Absolute 06/19/2023 0.14  0.00 - 0.40 x10E9/L Final    Basophils Absolute 06/19/2023 0.06  0.00 - 0.10 x10E9/L Final    Hemoglobin A1C 06/19/2023 5.0  % Final    Estimated Average Glucose 06/19/2023 97  MG/DL Final    TSH 06/19/2023 3.84  0.44 - 3.98 mIU/L Final    Glucose 06/19/2023 70 (L)  74 - 99 mg/dL Final    Sodium 06/19/2023 142  136 - 145 mmol/L Final    Potassium 06/19/2023 4.8  3.5 - 5.3 mmol/L Final    Chloride 06/19/2023 105  98 - 107 mmol/L Final    Bicarbonate 06/19/2023 29  21 - 32 mmol/L Final    Anion Gap 06/19/2023 13  10 - 20 mmol/L Final    Urea Nitrogen 06/19/2023 18  6 - 23 mg/dL Final    Creatinine 06/19/2023 0.99  0.50 - 1.05 mg/dL Final    GFR Female 06/19/2023 60  >90 mL/min/1.73m2 Final    Calcium 06/19/2023 9.9  8.6 - 10.6 mg/dL Final    Albumin 06/19/2023 4.4  3.4 - 5.0 g/dL Final    Alkaline Phosphatase 06/19/2023 68  33 - 136 U/L Final    Total Protein 06/19/2023 7.5  6.4 - 8.2 g/dL Final    AST 06/19/2023 12  9 - 39 U/L Final    Total Bilirubin 06/19/2023 0.5  0.0 - 1.2 mg/dL Final    ALT (SGPT) 06/19/2023 4 (L)  7 - 45 U/L Final    Cholesterol 06/19/2023 198  0 - 199 mg/dL Final    HDL 06/19/2023 62.2  mg/dL Final    Cholesterol/HDL Ratio 06/19/2023 3.2   Final    LDL 06/19/2023 116 (H)  0 - 99 mg/dL Final    VLDL 06/19/2023 20  0 - 40 mg/dL Final    Triglycerides 06/19/2023 101  0 - 149 mg/dL Final       Radiology: Reviewed imaging in powerchart.  XR DEXA bone  density    Result Date: 7/17/2023  DEXA BONE DENSITOMETRY SCAN History: M81.0 Tech notes: WHAT SYMPTOMS ARE YOU EXPERIENCING? - OSTEOPOROSIS COMPARISON: None SCAN PARAMETERS: Dual energy bone densitometry lumbar spine and left hip. FINDINGS: Total bone mineral density of the lumbar spine is 0.799 grams per square centimeters, T-score is -2.3, consistent with osteopenia. Bone mineral density of the left femoral neck is 0.370 grams per square centimeters, T-score is -4.3 consistent with osteoporosis. The 10 year fracture risk (FRAX) for major osteoporotic fracture is 39% and for hip fracture is 23%. IMPRESSION: Osteopenia of the lumbar spine Osteoporosis of the left hip Electronically signed by:  Ping Malcolm MD  7/18/2023 8:35 AM CDT Workstation: [id number] Technologist:  [email or web address] Dictated By:   PING MALCOLM MD Signed By:     PIGN MALCOLM MD Signed Out:    07/18/23 09:35:58    BI mammo bilateral screening    Result Date: 7/12/2023  SCREENING BILATERAL DIGITAL MAMMOGRAM Clinical Data: Screening. Comparison: None, or none available. Baseline study. Mammographic findings: There are scattered areas of fibroglandular density.  No suspicious masses or suspicious calcifications are identified in either breast. There is an 8 x 5 mm benign-appearing ovoid mass containing a couple small benign-appearing calcifications in the upper inner quadrant of the left breast at posterior depth. This examination was reviewed with the aid of CAD (computer assisted detection). IMPRESSION AND RECOMMENDATION: No mammographic evidence of malignancy. Recommend annual screening mammography in one year or sooner if clinically indicated. Category 2: Benign finding Electronically signed by:  Jeniffer Greene MD  7/18/2023 12:08 PM CDT Workstation: [id number] Technologist:  SABA Dictated By:   JENIFFER GREENE MD Signed By:     JENIFFER GREENE MD Signed Out:    07/18/23 13:10:51        Charting was completed using voice recognition technology and may include  unintended errors.

## 2023-08-11 NOTE — ASSESSMENT & PLAN NOTE
Refer to the cardiologist advised to get stress echo cardiac catheterization controlled BMI blood pressure LDL cholesterol hemoglobin A1c patient will continue amlodipine olmesartan Coreg aspirin

## 2023-09-25 ENCOUNTER — OFFICE VISIT (OUTPATIENT)
Dept: PRIMARY CARE | Facility: CLINIC | Age: 73
End: 2023-09-25
Payer: COMMERCIAL

## 2023-09-25 VITALS
BODY MASS INDEX: 39.16 KG/M2 | DIASTOLIC BLOOD PRESSURE: 87 MMHG | HEIGHT: 63 IN | HEART RATE: 71 BPM | OXYGEN SATURATION: 96 % | SYSTOLIC BLOOD PRESSURE: 138 MMHG | WEIGHT: 221 LBS

## 2023-09-25 DIAGNOSIS — E78.2 HYPERLIPEMIA, MIXED: ICD-10-CM

## 2023-09-25 DIAGNOSIS — E66.8 MODERATE OBESITY: ICD-10-CM

## 2023-09-25 DIAGNOSIS — M25.561 ACUTE PAIN OF RIGHT KNEE: ICD-10-CM

## 2023-09-25 DIAGNOSIS — M17.0 PRIMARY OSTEOARTHRITIS OF BOTH KNEES: Primary | ICD-10-CM

## 2023-09-25 DIAGNOSIS — I10 BENIGN ESSENTIAL HYPERTENSION: ICD-10-CM

## 2023-09-25 DIAGNOSIS — E66.01 CLASS 3 SEVERE OBESITY DUE TO EXCESS CALORIES WITH SERIOUS COMORBIDITY IN ADULT, UNSPECIFIED BMI (MULTI): ICD-10-CM

## 2023-09-25 PROBLEM — E66.813 CLASS 3 SEVERE OBESITY DUE TO EXCESS CALORIES WITH SERIOUS COMORBIDITY IN ADULT: Status: ACTIVE | Noted: 2023-06-19

## 2023-09-25 PROBLEM — I50.9 DYSPNEA DUE TO CONGESTIVE HEART FAILURE (MULTI): Status: RESOLVED | Noted: 2023-08-11 | Resolved: 2023-09-25

## 2023-09-25 PROBLEM — Z12.11 SCREEN FOR COLON CANCER: Status: RESOLVED | Noted: 2023-08-11 | Resolved: 2023-09-25

## 2023-09-25 PROCEDURE — 96372 THER/PROPH/DIAG INJ SC/IM: CPT | Performed by: INTERNAL MEDICINE

## 2023-09-25 PROCEDURE — 1036F TOBACCO NON-USER: CPT | Performed by: INTERNAL MEDICINE

## 2023-09-25 PROCEDURE — 3079F DIAST BP 80-89 MM HG: CPT | Performed by: INTERNAL MEDICINE

## 2023-09-25 PROCEDURE — 99214 OFFICE O/P EST MOD 30 MIN: CPT | Performed by: INTERNAL MEDICINE

## 2023-09-25 PROCEDURE — 1160F RVW MEDS BY RX/DR IN RCRD: CPT | Performed by: INTERNAL MEDICINE

## 2023-09-25 PROCEDURE — 1159F MED LIST DOCD IN RCRD: CPT | Performed by: INTERNAL MEDICINE

## 2023-09-25 PROCEDURE — 3075F SYST BP GE 130 - 139MM HG: CPT | Performed by: INTERNAL MEDICINE

## 2023-09-25 RX ORDER — PHENTERMINE HYDROCHLORIDE 37.5 MG/1
37.5 TABLET ORAL
Qty: 3 TABLET | Refills: 3 | Status: SHIPPED | OUTPATIENT
Start: 2023-09-25 | End: 2023-10-13 | Stop reason: SDUPTHER

## 2023-09-25 RX ORDER — MELOXICAM 15 MG/1
15 TABLET ORAL DAILY
Qty: 30 TABLET | Refills: 11 | Status: SHIPPED | OUTPATIENT
Start: 2023-09-25 | End: 2024-05-14 | Stop reason: ALTCHOICE

## 2023-09-25 RX ADMIN — TRIAMCINOLONE ACETONIDE 80 MG: 40 INJECTION, SUSPENSION INTRA-ARTICULAR; INTRAMUSCULAR at 09:13

## 2023-09-25 NOTE — PROGRESS NOTES
Subjective   Patient ID: Anna Freedman is a 72 y.o. female who presents for Follow-up.    Assessment/Plan     Problem List Items Addressed This Visit       Benign essential hypertension    Hyperlipemia, mixed    Class 3 severe obesity due to excess calories with serious comorbidity in adult (CMS/[facility])    Relevant Medications    meloxicam (Mobic) 15 mg tablet    phentermine (Adipex-P) 37.5 mg tablet    Other Relevant Orders    Drug Screen, Urine With Reflex to Confirmation    Acute pain of right knee    Primary osteoarthritis of both knees - Primary    Relevant Medications    meloxicam (Mobic) 15 mg tablet    phentermine (Adipex-P) 37.5 mg tablet   Patient was evaluated today, problem list was reviewed, problems and concerns addressed, Rx list reviewed and updated, lab and tests were noted and reviewed. Life style changes were discussed, always it works better if we eat plant based diet and plenty of fibres and roughage. Consume adequate amount of water and avoid alcohol, light to moderate physical activities and stress reduction are always beneficial for ongoing physical well being. Do not forget to have 6 to 7 hours of sleep regularly and avoid late night christopher screen exposure.      HPI this is a 72-year-old patient have hypertension hyperlipidemia obesity complaining of acute on chronic both knee pain more on the right compared to left onset acutely duration 1 week progressed acutely aggravated by activity relieved with rest associated problem moderate obesity    Hypertension amlodipine    Osteoarthritis meloxicam    Obesity diet exercise Adipex    Bursitis given Kenalog injection 40 without any complication to the right knee  History reviewed. No pertinent past medical history.  Past Surgical History:   Procedure Laterality Date    TOTAL KNEE ARTHROPLASTY  03/22/2016    Knee Replacement     No Known Allergies  Current Outpatient Medications   Medication Sig Dispense Refill    amLODIPine (Norvasc) 2.5 mg tablet Take  "1 tablet (2.5 mg) by mouth once daily.      BISOPROLOL FUMARATE ORAL Take by mouth.      meloxicam (Mobic) 15 mg tablet Take 1 tablet (15 mg) by mouth once daily. 30 tablet 11    phentermine (Adipex-P) 37.5 mg tablet Take 1 tablet (37.5 mg) by mouth once daily in the morning. Take before meals for 12 days. 3 tablet 3     No current facility-administered medications for this visit.     Family History   Problem Relation Name Age of Onset    No Known Problems Mother      No Known Problems Father       Social History     Socioeconomic History    Marital status:      Spouse name: None    Number of children: None    Years of education: None    Highest education level: None   Occupational History    None   Tobacco Use    Smoking status: Never    Smokeless tobacco: Never   Substance and Sexual Activity    Alcohol use: Never    Drug use: Never    Sexual activity: None   Other Topics Concern    None   Social History Narrative    None     Social Determinants of Health     Financial Resource Strain: Not on file   Food Insecurity: Not on file   Transportation Needs: Not on file   Physical Activity: Not on file   Stress: Not on file   Social Connections: Not on file   Intimate Partner Violence: Not on file   Housing Stability: Not on file     Immunization History   Administered Date(s) Administered    Influenza, High Dose Seasonal, Preservative Free 09/30/2015, 10/05/2016    Influenza, Unspecified 10/05/2016    Influenza, seasonal, injectable 09/24/2014    Pneumococcal conjugate vaccine, 13-valent (PREVNAR 13) 09/30/2015    Pneumococcal polysaccharide vaccine, 23-valent, age 2 years and older (PNEUMOVAX 23) 12/20/2014    Td vaccine, age 7 years and older (TENIVAC) 04/04/2016       Review of Systems  Review of systems is otherwise negative unless stated above or in history of present illness.    Objective   Visit Vitals  /87   Pulse 71   Ht 1.6 m (5' 3\")   Wt 100 kg (221 lb)   SpO2 96%   BMI 39.15 kg/m²   Smoking " Status Never   BSA 2.11 m²     Physical Exam  Constitutional: Obesity     General: not in acute distress.   HENT:      Head: Normocephalic and atraumatic.      Nose: Nose normal.   Eyes:      Extraocular Movements: Extraocular movements intact.      Conjunctiva/sclera: Conjunctivae normal.   Cardiovascular: Heart murmur     Rate and Rhythm: Normal rate ,  No M/R/G  Pulmonary:      Effort: Pulmonary effort is normal.      Breath sounds: Normal, Bilat Equal AE  Skin:     General: Skin is warm.   Neurological: Neuralgia     Mental Status: He is alert and oriented to person, place, and time.   Psychiatric:         Mood and Affect: Mood normal.         Behavior: Behavior normal.   Musculoskeletal moderate to severe arthritis of the both knee bursitis tenderness right knee    Lab on 07/18/2023   Component Date Value Ref Range Status    Glucose 07/18/2023 115 (H)  74 - 99 mg/dL Final    Sodium 07/18/2023 137  136 - 145 mmol/L Final    Potassium 07/18/2023 5.3  3.5 - 5.3 mmol/L Final    Chloride 07/18/2023 102  98 - 107 mmol/L Final    Bicarbonate 07/18/2023 29  21 - 32 mmol/L Final    Anion Gap 07/18/2023 11  10 - 20 mmol/L Final    Urea Nitrogen 07/18/2023 18  6 - 23 mg/dL Final    Creatinine 07/18/2023 1.04  0.50 - 1.05 mg/dL Final    GFR Female 07/18/2023 56 (A)  >90 mL/min/1.73m2 Final    Calcium 07/18/2023 9.7  8.6 - 10.6 mg/dL Final    Albumin 07/18/2023 4.3  3.4 - 5.0 g/dL Final    Alkaline Phosphatase 07/18/2023 70  33 - 136 U/L Final    Total Protein 07/18/2023 7.0  6.4 - 8.2 g/dL Final    AST 07/18/2023 15  9 - 39 U/L Final    Total Bilirubin 07/18/2023 0.4  0.0 - 1.2 mg/dL Final    ALT (SGPT) 07/18/2023 5 (L)  7 - 45 U/L Final   Lab on 06/19/2023   Component Date Value Ref Range Status    ALBUMIN (MG/L) IN URINE 06/19/2023 45.2  Not Established mg/L Final    Albumin/Creatine Ratio 06/19/2023 32.3 (H)  0.0 - 30.0 ug/mg crt Final    Creatinine, Urine 06/19/2023 140.0  20.0 - 320.0 mg/dL Final    WBC 06/19/2023 8.1   4.4 - 11.3 x10E9/L Final    nRBC 06/19/2023 0.0  0.0 - 0.0 /100 WBC Final    RBC 06/19/2023 4.16  4.00 - 5.20 x10E12/L Final    Hemoglobin 06/19/2023 12.3  12.0 - 16.0 g/dL Final    Hematocrit 06/19/2023 40.7  36.0 - 46.0 % Final    MCV 06/19/2023 98  80 - 100 fL Final    MCHC 06/19/2023 30.2 (L)  32.0 - 36.0 g/dL Final    Platelets 06/19/2023 289  150 - 450 x10E9/L Final    RDW 06/19/2023 12.3  11.5 - 14.5 % Final    Neutrophils % 06/19/2023 56.5  40.0 - 80.0 % Final    Immature Granulocytes %, Automated 06/19/2023 0.5  0.0 - 0.9 % Final    Lymphocytes % 06/19/2023 31.6  13.0 - 44.0 % Final    Monocytes % 06/19/2023 9.0  2.0 - 10.0 % Final    Eosinophils % 06/19/2023 1.7  0.0 - 6.0 % Final    Basophils % 06/19/2023 0.7  0.0 - 2.0 % Final    Neutrophils Absolute 06/19/2023 4.56  1.60 - 5.50 x10E9/L Final    Lymphocytes Absolute 06/19/2023 2.55  0.80 - 3.00 x10E9/L Final    Monocytes Absolute 06/19/2023 0.73  0.05 - 0.80 x10E9/L Final    Eosinophils Absolute 06/19/2023 0.14  0.00 - 0.40 x10E9/L Final    Basophils Absolute 06/19/2023 0.06  0.00 - 0.10 x10E9/L Final    Hemoglobin A1C 06/19/2023 5.0  % Final    Estimated Average Glucose 06/19/2023 97  MG/DL Final    TSH 06/19/2023 3.84  0.44 - 3.98 mIU/L Final    Glucose 06/19/2023 70 (L)  74 - 99 mg/dL Final    Sodium 06/19/2023 142  136 - 145 mmol/L Final    Potassium 06/19/2023 4.8  3.5 - 5.3 mmol/L Final    Chloride 06/19/2023 105  98 - 107 mmol/L Final    Bicarbonate 06/19/2023 29  21 - 32 mmol/L Final    Anion Gap 06/19/2023 13  10 - 20 mmol/L Final    Urea Nitrogen 06/19/2023 18  6 - 23 mg/dL Final    Creatinine 06/19/2023 0.99  0.50 - 1.05 mg/dL Final    GFR Female 06/19/2023 60  >90 mL/min/1.73m2 Final    Calcium 06/19/2023 9.9  8.6 - 10.6 mg/dL Final    Albumin 06/19/2023 4.4  3.4 - 5.0 g/dL Final    Alkaline Phosphatase 06/19/2023 68  33 - 136 U/L Final    Total Protein 06/19/2023 7.5  6.4 - 8.2 g/dL Final    AST 06/19/2023 12  9 - 39 U/L Final    Total  Bilirubin 06/19/2023 0.5  0.0 - 1.2 mg/dL Final    ALT (SGPT) 06/19/2023 4 (L)  7 - 45 U/L Final    Cholesterol 06/19/2023 198  0 - 199 mg/dL Final    HDL 06/19/2023 62.2  mg/dL Final    Cholesterol/HDL Ratio 06/19/2023 3.2   Final    LDL 06/19/2023 116 (H)  0 - 99 mg/dL Final    VLDL 06/19/2023 20  0 - 40 mg/dL Final    Triglycerides 06/19/2023 101  0 - 149 mg/dL Final       Radiology: Reviewed imaging in powerchart.  No results found.      Charting was completed using voice recognition technology and may include unintended errors.

## 2023-09-26 RX ORDER — TRIAMCINOLONE ACETONIDE 40 MG/ML
80 INJECTION, SUSPENSION INTRA-ARTICULAR; INTRAMUSCULAR ONCE
Status: COMPLETED | OUTPATIENT
Start: 2023-09-26 | End: 2023-09-25

## 2023-10-05 DIAGNOSIS — M17.0 PRIMARY OSTEOARTHRITIS OF BOTH KNEES: ICD-10-CM

## 2023-10-05 RX ORDER — DICLOFENAC SODIUM 10 MG/G
4 GEL TOPICAL 2 TIMES DAILY PRN
Qty: 100 G | Refills: 0 | Status: SHIPPED | OUTPATIENT
Start: 2023-10-05 | End: 2023-11-06

## 2023-10-13 ENCOUNTER — OFFICE VISIT (OUTPATIENT)
Dept: PRIMARY CARE | Facility: CLINIC | Age: 73
End: 2023-10-13
Payer: COMMERCIAL

## 2023-10-13 VITALS
OXYGEN SATURATION: 96 % | BODY MASS INDEX: 39.9 KG/M2 | SYSTOLIC BLOOD PRESSURE: 178 MMHG | HEIGHT: 63 IN | DIASTOLIC BLOOD PRESSURE: 117 MMHG | WEIGHT: 225.2 LBS | HEART RATE: 74 BPM

## 2023-10-13 DIAGNOSIS — I10 BENIGN ESSENTIAL HYPERTENSION: ICD-10-CM

## 2023-10-13 DIAGNOSIS — M17.0 PRIMARY OSTEOARTHRITIS OF BOTH KNEES: ICD-10-CM

## 2023-10-13 DIAGNOSIS — E66.01 CLASS 3 SEVERE OBESITY DUE TO EXCESS CALORIES WITH SERIOUS COMORBIDITY IN ADULT, UNSPECIFIED BMI (MULTI): ICD-10-CM

## 2023-10-13 DIAGNOSIS — E55.9 VITAMIN D DEFICIENCY: ICD-10-CM

## 2023-10-13 LAB
NON-UH HIE HGB A1C: 5 %
NON-UH HIE TSH: 2.96 UIU/ML (ref 0.55–4.78)

## 2023-10-13 PROCEDURE — 99214 OFFICE O/P EST MOD 30 MIN: CPT | Performed by: INTERNAL MEDICINE

## 2023-10-13 PROCEDURE — 1159F MED LIST DOCD IN RCRD: CPT | Performed by: INTERNAL MEDICINE

## 2023-10-13 PROCEDURE — 3077F SYST BP >= 140 MM HG: CPT | Performed by: INTERNAL MEDICINE

## 2023-10-13 PROCEDURE — 1160F RVW MEDS BY RX/DR IN RCRD: CPT | Performed by: INTERNAL MEDICINE

## 2023-10-13 PROCEDURE — 1036F TOBACCO NON-USER: CPT | Performed by: INTERNAL MEDICINE

## 2023-10-13 PROCEDURE — 3080F DIAST BP >= 90 MM HG: CPT | Performed by: INTERNAL MEDICINE

## 2023-10-13 RX ORDER — ERGOCALCIFEROL 1.25 MG/1
50000 CAPSULE ORAL
Qty: 8 CAPSULE | Refills: 0 | Status: SHIPPED | OUTPATIENT
Start: 2023-10-13 | End: 2023-12-08

## 2023-10-13 RX ORDER — AMLODIPINE BESYLATE 5 MG/1
5 TABLET ORAL DAILY
Qty: 30 TABLET | Refills: 5 | Status: SHIPPED | OUTPATIENT
Start: 2023-10-13 | End: 2023-10-24 | Stop reason: SDUPTHER

## 2023-10-13 RX ORDER — PHENTERMINE HYDROCHLORIDE 37.5 MG/1
37.5 TABLET ORAL
Qty: 30 TABLET | Refills: 0 | Status: SHIPPED | OUTPATIENT
Start: 2023-10-13 | End: 2024-05-14 | Stop reason: ALTCHOICE

## 2023-10-14 NOTE — PROGRESS NOTES
Subjective   Patient ID: Anna Freedman is a 72 y.o. female who presents for Pain (Knee pain).    Assessment/Plan     Problem List Items Addressed This Visit       Benign essential hypertension     Patients BP readings reviewed and addressed, as we age our arteries turn stiffer and less elastic. Restricting salt consumption and staying physically fit with regular exercise regimen is the only way to keep our vasculature less tonic. Studies have shown that keeping ideal body wt, exercise routine about 140 to 150 minutes a week, eating variety of plant based diet and drinking plentiful water are quite helpful. Monitor BP twice or once a week at home and bring log to be reviewed by me. Uncontrolled BP has long term consequences including heart failure, myocardial infarction, accelerated atherosclerosis and kidney dysfunction. Therapy reviewed and explained.           Relevant Medications    amLODIPine (Norvasc) 5 mg tablet    Class 3 severe obesity due to excess calories with serious comorbidity in adult (CMS/[facility])     I personally reviewed the OARRS report for this patient. This report is scanned into the electronic medical record. I have considered  the risks of abuse, dependence, addiction and diversion. After discussion, patient does have a good understanding of the safety and  risks of this medication. I believe that it is clinically appropriate for this patient to be prescribed this medication.  See patient back in 6 weeks.         Relevant Medications    phentermine (Adipex-P) 37.5 mg tablet    Primary osteoarthritis of both knees    Relevant Medications    phentermine (Adipex-P) 37.5 mg tablet     Other Visit Diagnoses       Vitamin D deficiency        Relevant Medications    ergocalciferol (Vitamin D-2) 1.25 MG (89924 UT) capsule        Patient was evaluated today, problem list was reviewed, problems and concerns addressed, Rx list reviewed and updated, lab and tests were noted and reviewed. Life style changes  were discussed, always it works better if we eat plant based diet and plenty of fibres and roughage. Consume adequate amount of water and avoid alcohol, light to moderate physical activities and stress reduction are always beneficial for ongoing physical well being. Do not forget to have 6 to 7 hours of sleep regularly and avoid late night christopher screen exposure.      HPI 72-year-old patient complaining of the left foot pain left knee pain obesity    Pain in the L knee and the foot onset gradually duration 3 months progressed slowly aggravating factor activity relieving factor none associated problem obesity    Hypertension amlodipine 5 mg a day vitamin D deficiency vitamin D supplement osteoarthritis meloxicam obesity Adipex  History reviewed. No pertinent past medical history.  Past Surgical History:   Procedure Laterality Date    TOTAL KNEE ARTHROPLASTY  03/22/2016    Knee Replacement     No Known Allergies  Current Outpatient Medications   Medication Sig Dispense Refill    BISOPROLOL FUMARATE ORAL Take by mouth.      diclofenac sodium (Voltaren) 1 % gel gel Apply 1 Application topically 2 times a day as needed (as needed). 100 g 0    meloxicam (Mobic) 15 mg tablet Take 1 tablet (15 mg) by mouth once daily. 30 tablet 11    amLODIPine (Norvasc) 5 mg tablet Take 1 tablet (5 mg) by mouth once daily. 30 tablet 5    ergocalciferol (Vitamin D-2) 1.25 MG (77883 UT) capsule Take 1 capsule (50,000 Units) by mouth 1 (one) time per week. 8 capsule 0    phentermine (Adipex-P) 37.5 mg tablet Take 1 tablet (37.5 mg) by mouth once daily in the morning. Take before meals. 30 tablet 0     No current facility-administered medications for this visit.     Family History   Problem Relation Name Age of Onset    No Known Problems Mother      No Known Problems Father       Social History     Socioeconomic History    Marital status:      Spouse name: None    Number of children: None    Years of education: None    Highest education  "level: None   Occupational History    None   Tobacco Use    Smoking status: Never    Smokeless tobacco: Never   Vaping Use    Vaping Use: Never used   Substance and Sexual Activity    Alcohol use: Never    Drug use: Never    Sexual activity: None   Other Topics Concern    None   Social History Narrative    None     Social Determinants of Health     Financial Resource Strain: Not on file   Food Insecurity: Not on file   Transportation Needs: Not on file   Physical Activity: Not on file   Stress: Not on file   Social Connections: Not on file   Intimate Partner Violence: Not on file   Housing Stability: Not on file     Immunization History   Administered Date(s) Administered    Influenza, High Dose Seasonal, Preservative Free 09/30/2015, 10/05/2016    Influenza, Unspecified 10/05/2016    Influenza, seasonal, injectable 09/24/2014    Pneumococcal conjugate vaccine, 13-valent (PREVNAR 13) 09/30/2015    Pneumococcal polysaccharide vaccine, 23-valent, age 2 years and older (PNEUMOVAX 23) 12/20/2014    Td vaccine, age 7 years and older (TENIVAC) 04/04/2016       Review of Systems  Review of systems is otherwise negative unless stated above or in history of present illness.    Objective   Visit Vitals  BP (!) 178/117   Pulse 74   Ht 1.6 m (5' 3\")   Wt 102 kg (225 lb 3.2 oz)   SpO2 96%   BMI 39.89 kg/m²   Smoking Status Never   BSA 2.13 m²     Physical Exam  Constitutional: Obesity     General: not in acute distress.   HENT:      Head: Normocephalic and atraumatic.      Nose: Nose normal.   Eyes:      Extraocular Movements: Extraocular movements intact.      Conjunctiva/sclera: Conjunctivae normal.   Cardiovascular: S1-S2 S4     Rate and Rhythm: Normal rate ,  No M/R/G  Pulmonary:      Effort: Pulmonary effort is normal.      Breath sounds: Normal, Bilat Equal AE  Skin:     General: Skin is warm.   Neurological: Neuralgia     Mental Status: He is alert and oriented to person, place, and time.   Psychiatric:         Mood and " Affect: Mood normal.         Behavior: Behavior normal.   Musculoskeletal arthritis of left and left knee refer patient to Dr. Jose Vincent podiatry X  FROM in all extremitirs,  Joint-no swelling or tenderness    Orders Only on 10/13/2023   Component Date Value Ref Range Status    [facility] HGB A1C 10/13/2023 5.0  % Final    [facility] TSH 10/13/2023 2.96  0.55 - 4.78 uIU/ml Final   Lab on 07/18/2023   Component Date Value Ref Range Status    Glucose 07/18/2023 115 (H)  74 - 99 mg/dL Final    Sodium 07/18/2023 137  136 - 145 mmol/L Final    Potassium 07/18/2023 5.3  3.5 - 5.3 mmol/L Final    Chloride 07/18/2023 102  98 - 107 mmol/L Final    Bicarbonate 07/18/2023 29  21 - 32 mmol/L Final    Anion Gap 07/18/2023 11  10 - 20 mmol/L Final    Urea Nitrogen 07/18/2023 18  6 - 23 mg/dL Final    Creatinine 07/18/2023 1.04  0.50 - 1.05 mg/dL Final    GFR Female 07/18/2023 56 (A)  >90 mL/min/1.73m2 Final    Calcium 07/18/2023 9.7  8.6 - 10.6 mg/dL Final    Albumin 07/18/2023 4.3  3.4 - 5.0 g/dL Final    Alkaline Phosphatase 07/18/2023 70  33 - 136 U/L Final    Total Protein 07/18/2023 7.0  6.4 - 8.2 g/dL Final    AST 07/18/2023 15  9 - 39 U/L Final    Total Bilirubin 07/18/2023 0.4  0.0 - 1.2 mg/dL Final    ALT (SGPT) 07/18/2023 5 (L)  7 - 45 U/L Final   Lab on 06/19/2023   Component Date Value Ref Range Status    ALBUMIN (MG/L) IN URINE 06/19/2023 45.2  Not Established mg/L Final    Albumin/Creatine Ratio 06/19/2023 32.3 (H)  0.0 - 30.0 ug/mg crt Final    Creatinine, Urine 06/19/2023 140.0  20.0 - 320.0 mg/dL Final    WBC 06/19/2023 8.1  4.4 - 11.3 x10E9/L Final    nRBC 06/19/2023 0.0  0.0 - 0.0 /100 WBC Final    RBC 06/19/2023 4.16  4.00 - 5.20 x10E12/L Final    Hemoglobin 06/19/2023 12.3  12.0 - 16.0 g/dL Final    Hematocrit 06/19/2023 40.7  36.0 - 46.0 % Final    MCV 06/19/2023 98  80 - 100 fL Final    MCHC 06/19/2023 30.2 (L)  32.0 - 36.0 g/dL Final    Platelets 06/19/2023 289  150 - 450 x10E9/L Final    RDW  06/19/2023 12.3  11.5 - 14.5 % Final    Neutrophils % 06/19/2023 56.5  40.0 - 80.0 % Final    Immature Granulocytes %, Automated 06/19/2023 0.5  0.0 - 0.9 % Final    Lymphocytes % 06/19/2023 31.6  13.0 - 44.0 % Final    Monocytes % 06/19/2023 9.0  2.0 - 10.0 % Final    Eosinophils % 06/19/2023 1.7  0.0 - 6.0 % Final    Basophils % 06/19/2023 0.7  0.0 - 2.0 % Final    Neutrophils Absolute 06/19/2023 4.56  1.60 - 5.50 x10E9/L Final    Lymphocytes Absolute 06/19/2023 2.55  0.80 - 3.00 x10E9/L Final    Monocytes Absolute 06/19/2023 0.73  0.05 - 0.80 x10E9/L Final    Eosinophils Absolute 06/19/2023 0.14  0.00 - 0.40 x10E9/L Final    Basophils Absolute 06/19/2023 0.06  0.00 - 0.10 x10E9/L Final    Hemoglobin A1C 06/19/2023 5.0  % Final    Estimated Average Glucose 06/19/2023 97  MG/DL Final    TSH 06/19/2023 3.84  0.44 - 3.98 mIU/L Final    Glucose 06/19/2023 70 (L)  74 - 99 mg/dL Final    Sodium 06/19/2023 142  136 - 145 mmol/L Final    Potassium 06/19/2023 4.8  3.5 - 5.3 mmol/L Final    Chloride 06/19/2023 105  98 - 107 mmol/L Final    Bicarbonate 06/19/2023 29  21 - 32 mmol/L Final    Anion Gap 06/19/2023 13  10 - 20 mmol/L Final    Urea Nitrogen 06/19/2023 18  6 - 23 mg/dL Final    Creatinine 06/19/2023 0.99  0.50 - 1.05 mg/dL Final    GFR Female 06/19/2023 60  >90 mL/min/1.73m2 Final    Calcium 06/19/2023 9.9  8.6 - 10.6 mg/dL Final    Albumin 06/19/2023 4.4  3.4 - 5.0 g/dL Final    Alkaline Phosphatase 06/19/2023 68  33 - 136 U/L Final    Total Protein 06/19/2023 7.5  6.4 - 8.2 g/dL Final    AST 06/19/2023 12  9 - 39 U/L Final    Total Bilirubin 06/19/2023 0.5  0.0 - 1.2 mg/dL Final    ALT (SGPT) 06/19/2023 4 (L)  7 - 45 U/L Final    Cholesterol 06/19/2023 198  0 - 199 mg/dL Final    HDL 06/19/2023 62.2  mg/dL Final    Cholesterol/HDL Ratio 06/19/2023 3.2   Final    LDL 06/19/2023 116 (H)  0 - 99 mg/dL Final    VLDL 06/19/2023 20  0 - 40 mg/dL Final    Triglycerides 06/19/2023 101  0 - 149 mg/dL Final        Radiology: Reviewed imaging in powerchart.  No results found.      Charting was completed using voice recognition technology and may include unintended errors.

## 2023-10-17 ENCOUNTER — TELEPHONE (OUTPATIENT)
Dept: PRIMARY CARE | Facility: CLINIC | Age: 73
End: 2023-10-17
Payer: COMMERCIAL

## 2023-10-17 NOTE — TELEPHONE ENCOUNTER
Patient called and stated that 2 meds were to be sent to the pharmacy but they are not there  They dont know the names  Please advise

## 2023-10-17 NOTE — TELEPHONE ENCOUNTER
----- Message from Mari Gooden MD sent at 10/17/2023 10:56 AM EDT -----  Possible fracture advised to see Dr. Vincent

## 2023-10-24 ENCOUNTER — OFFICE VISIT (OUTPATIENT)
Dept: PRIMARY CARE | Facility: CLINIC | Age: 73
End: 2023-10-24
Payer: COMMERCIAL

## 2023-10-24 VITALS
WEIGHT: 220 LBS | DIASTOLIC BLOOD PRESSURE: 92 MMHG | BODY MASS INDEX: 38.98 KG/M2 | OXYGEN SATURATION: 96 % | SYSTOLIC BLOOD PRESSURE: 158 MMHG | HEIGHT: 63 IN | TEMPERATURE: 97.1 F | HEART RATE: 70 BPM

## 2023-10-24 DIAGNOSIS — E66.01 CLASS 3 SEVERE OBESITY DUE TO EXCESS CALORIES WITH SERIOUS COMORBIDITY IN ADULT, UNSPECIFIED BMI (MULTI): ICD-10-CM

## 2023-10-24 DIAGNOSIS — E78.2 HYPERLIPEMIA, MIXED: ICD-10-CM

## 2023-10-24 DIAGNOSIS — I10 BENIGN ESSENTIAL HYPERTENSION: ICD-10-CM

## 2023-10-24 DIAGNOSIS — M25.561 ACUTE PAIN OF RIGHT KNEE: Primary | ICD-10-CM

## 2023-10-24 PROCEDURE — 3080F DIAST BP >= 90 MM HG: CPT | Performed by: INTERNAL MEDICINE

## 2023-10-24 PROCEDURE — 96372 THER/PROPH/DIAG INJ SC/IM: CPT | Performed by: INTERNAL MEDICINE

## 2023-10-24 PROCEDURE — 1160F RVW MEDS BY RX/DR IN RCRD: CPT | Performed by: INTERNAL MEDICINE

## 2023-10-24 PROCEDURE — 3077F SYST BP >= 140 MM HG: CPT | Performed by: INTERNAL MEDICINE

## 2023-10-24 PROCEDURE — 1159F MED LIST DOCD IN RCRD: CPT | Performed by: INTERNAL MEDICINE

## 2023-10-24 PROCEDURE — 1036F TOBACCO NON-USER: CPT | Performed by: INTERNAL MEDICINE

## 2023-10-24 PROCEDURE — 99213 OFFICE O/P EST LOW 20 MIN: CPT | Performed by: INTERNAL MEDICINE

## 2023-10-24 RX ORDER — AMLODIPINE BESYLATE 5 MG/1
TABLET ORAL
Qty: 135 TABLET | Refills: 1 | Status: SHIPPED | OUTPATIENT
Start: 2023-10-24 | End: 2024-05-14 | Stop reason: ALTCHOICE

## 2023-10-24 RX ORDER — TRIAMCINOLONE ACETONIDE 40 MG/ML
40 INJECTION, SUSPENSION INTRA-ARTICULAR; INTRAMUSCULAR ONCE
Status: COMPLETED | OUTPATIENT
Start: 2023-10-24 | End: 2023-10-24

## 2023-10-24 RX ADMIN — TRIAMCINOLONE ACETONIDE 40 MG: 40 INJECTION, SUSPENSION INTRA-ARTICULAR; INTRAMUSCULAR at 12:11

## 2023-10-24 NOTE — PROGRESS NOTES
Subjective   Patient ID: Anna Freedman is a 72 y.o. female who presents for Leg Pain (Right ).    Assessment/Plan     Problem List Items Addressed This Visit       Benign essential hypertension     Patients BP readings reviewed and addressed, as we age our arteries turn stiffer and less elastic. Restricting salt consumption and staying physically fit with regular exercise regimen is the only way to keep our vasculature less tonic. Studies have shown that keeping ideal body wt, exercise routine about 140 to 150 minutes a week, eating variety of plant based diet and drinking plentiful water are quite helpful. Monitor BP twice or once a week at home and bring log to be reviewed by me. Uncontrolled BP has long term consequences including heart failure, myocardial infarction, accelerated atherosclerosis and kidney dysfunction. Therapy reviewed and explained.           Hyperlipemia, mixed    Class 3 severe obesity due to excess calories with serious comorbidity in adult (CMS/[facility])     I personally reviewed the OARRS report for this patient. This report is scanned into the electronic medical record. I have considered  the risks of abuse, dependence, addiction and diversion. After discussion, patient does have a good understanding of the safety and  risks of this medication. I believe that it is clinically appropriate for this patient to be prescribed this medication.  See patient back in 6 weeks.         Acute pain of right knee - Primary     Physical therapy Occupational Therapy Kenalog 40 injections nonsurgical management          Patient was evaluated today, problem list was reviewed, problems and concerns addressed, Rx list reviewed and updated, lab and tests were noted and reviewed. Life style changes were discussed, always it works better if we eat plant based diet and plenty of fibres and roughage. Consume adequate amount of water and avoid alcohol, light to moderate physical activities and stress reduction are  always beneficial for ongoing physical well being. Do not forget to have 6 to 7 hours of sleep regularly and avoid late night christopher screen exposure.    HPI 72-year-old patient of obesity osteoarthritis of hypertension hyperlipidemia complaining acute on chronic right knee pain tried ibuprofen and meloxicam physical therapy does not get any better patient refused for surgery advised to get Kenalog injections vitamin C vitamin D weight reduction and follow-up    Moderate obesity associate with arthritis sleep apnea uncontrolled hypertension advised increase patient amlodipine to 7.5 mg a day continue Adipex no meloxicam and follow-up  History reviewed. No pertinent past medical history.  Past Surgical History:   Procedure Laterality Date    TOTAL KNEE ARTHROPLASTY  03/22/2016    Knee Replacement     No Known Allergies  Current Outpatient Medications   Medication Sig Dispense Refill    amLODIPine (Norvasc) 5 mg tablet Take 1 tablet (5 mg) by mouth once daily. 30 tablet 5    BISOPROLOL FUMARATE ORAL Take by mouth.      diclofenac sodium (Voltaren) 1 % gel gel Apply 1 Application topically 2 times a day as needed (as needed). 100 g 0    ergocalciferol (Vitamin D-2) 1.25 MG (17077 UT) capsule Take 1 capsule (50,000 Units) by mouth 1 (one) time per week. 8 capsule 0    meloxicam (Mobic) 15 mg tablet Take 1 tablet (15 mg) by mouth once daily. 30 tablet 11    phentermine (Adipex-P) 37.5 mg tablet Take 1 tablet (37.5 mg) by mouth once daily in the morning. Take before meals. 30 tablet 0     No current facility-administered medications for this visit.     Family History   Problem Relation Name Age of Onset    No Known Problems Mother      No Known Problems Father       Social History     Socioeconomic History    Marital status:      Spouse name: None    Number of children: None    Years of education: None    Highest education level: None   Occupational History    None   Tobacco Use    Smoking status: Never    Smokeless  "tobacco: Never   Vaping Use    Vaping Use: Never used   Substance and Sexual Activity    Alcohol use: Never    Drug use: Never    Sexual activity: None   Other Topics Concern    None   Social History Narrative    None     Social Determinants of Health     Financial Resource Strain: Not on file   Food Insecurity: Not on file   Transportation Needs: Not on file   Physical Activity: Not on file   Stress: Not on file   Social Connections: Not on file   Intimate Partner Violence: Not on file   Housing Stability: Not on file     Immunization History   Administered Date(s) Administered    Influenza, High Dose Seasonal, Preservative Free 09/30/2015, 10/05/2016    Influenza, Unspecified 10/05/2016    Influenza, seasonal, injectable 09/24/2014    Pneumococcal conjugate vaccine, 13-valent (PREVNAR 13) 09/30/2015    Pneumococcal polysaccharide vaccine, 23-valent, age 2 years and older (PNEUMOVAX 23) 12/20/2014    Td vaccine, age 7 years and older (TENIVAC) 04/04/2016       Review of Systems  Review of systems is otherwise negative unless stated above or in history of present illness.    Objective   Visit Vitals  BP (!) 158/92 (BP Location: Right arm, Patient Position: Sitting, BP Cuff Size: Large adult)   Pulse 70   Temp 36.2 °C (97.1 °F)   Ht 1.6 m (5' 3\")   Wt 99.8 kg (220 lb)   SpO2 96%   BMI 38.97 kg/m²   Smoking Status Never   BSA 2.11 m²     Physical Exam  Constitutional: Obesity   general: not in acute distress.   HENT:      Head: Normocephalic and atraumatic.      Nose: Nose normal.   Eyes: Eyeglasses     Extraocular Movements: Extraocular movements intact.      Conjunctiva/sclera: Conjunctivae normal.   Cardiovascular: Heart murmur     Rate and Rhythm: Normal rate ,  No M/R/G  Pulmonary:      Effort: Pulmonary effort is normal.      Breath sounds: Normal, Bilat Equal AE  Skin:     General: Skin is warm.   Neurological: Neuralgia     Mental Status: He is alert and oriented to person, place, and time.   Psychiatric:    "      Mood and Affect: Mood normal.         Behavior: Behavior normal.   Musculoskeletal advanced osteoarthritis of the right knee  FROM in all extremitirs,  Joint-no swelling or tenderness    Orders Only on 10/13/2023   Component Date Value Ref Range Status    [facility] HGB A1C 10/13/2023 5.0  % Final    [facility] TSH 10/13/2023 2.96  0.55 - 4.78 uIU/ml Final   Lab on 07/18/2023   Component Date Value Ref Range Status    Glucose 07/18/2023 115 (H)  74 - 99 mg/dL Final    Sodium 07/18/2023 137  136 - 145 mmol/L Final    Potassium 07/18/2023 5.3  3.5 - 5.3 mmol/L Final    Chloride 07/18/2023 102  98 - 107 mmol/L Final    Bicarbonate 07/18/2023 29  21 - 32 mmol/L Final    Anion Gap 07/18/2023 11  10 - 20 mmol/L Final    Urea Nitrogen 07/18/2023 18  6 - 23 mg/dL Final    Creatinine 07/18/2023 1.04  0.50 - 1.05 mg/dL Final    GFR Female 07/18/2023 56 (A)  >90 mL/min/1.73m2 Final    Calcium 07/18/2023 9.7  8.6 - 10.6 mg/dL Final    Albumin 07/18/2023 4.3  3.4 - 5.0 g/dL Final    Alkaline Phosphatase 07/18/2023 70  33 - 136 U/L Final    Total Protein 07/18/2023 7.0  6.4 - 8.2 g/dL Final    AST 07/18/2023 15  9 - 39 U/L Final    Total Bilirubin 07/18/2023 0.4  0.0 - 1.2 mg/dL Final    ALT (SGPT) 07/18/2023 5 (L)  7 - 45 U/L Final       Radiology: Reviewed imaging in powerchart.  XR foot left 3+ views    Result Date: 10/13/2023  XR  left foot 3 views CLINICAL HISTORY: Pain in the left foot COMPARISON: 1/16/2019 FINDINGS: Bones are mildly osteopenic. There is a healed fracture of the shaft of the second metatarsal. Mild osteoarthritis in the forefoot including the first MTP joint. There is mild offset of bone in the medial aspect of the head of the fifth proximal phalanx and mild depression of its distal articular surface. These are new findings from the earlier study suggesting a fracture that may be nonacute and perhaps united. No other acute fracture or dislocation. Flatfoot deformity on the lateral view. Osteoarthritis  of the ankle and plantar calcaneal spur. IMPRESSION: No definite acute skeletal abnormality in the foot. There is suggestion of a fracture of the head of the fifth proximal phalanx of uncertain age. Correlate with point tenderness. Other incidental findings as described. Electronically signed by:  Felice Matt MD  10/16/2023 07:28 PM EDT Workstation: [id number] Technologist:  IC Dictated By:   FELICE MATT MD Signed By:     FELICE MATT MD Signed Out:    10/16/23 19:28:39        Charting was completed using voice recognition technology and may include unintended errors.

## 2023-10-30 ENCOUNTER — APPOINTMENT (OUTPATIENT)
Dept: PRIMARY CARE | Facility: CLINIC | Age: 73
End: 2023-10-30
Payer: COMMERCIAL

## 2023-11-05 DIAGNOSIS — M17.0 PRIMARY OSTEOARTHRITIS OF BOTH KNEES: ICD-10-CM

## 2023-11-06 RX ORDER — DICLOFENAC SODIUM 10 MG/G
GEL TOPICAL
Qty: 100 G | Refills: 0 | Status: SHIPPED | OUTPATIENT
Start: 2023-11-06 | End: 2024-05-14 | Stop reason: ALTCHOICE

## 2024-05-13 ENCOUNTER — APPOINTMENT (OUTPATIENT)
Dept: PRIMARY CARE | Facility: CLINIC | Age: 74
End: 2024-05-13
Payer: COMMERCIAL

## 2024-05-14 ENCOUNTER — LAB (OUTPATIENT)
Dept: LAB | Facility: LAB | Age: 74
End: 2024-05-14
Payer: COMMERCIAL

## 2024-05-14 ENCOUNTER — OFFICE VISIT (OUTPATIENT)
Dept: PRIMARY CARE | Facility: CLINIC | Age: 74
End: 2024-05-14
Payer: COMMERCIAL

## 2024-05-14 VITALS
DIASTOLIC BLOOD PRESSURE: 72 MMHG | WEIGHT: 233 LBS | SYSTOLIC BLOOD PRESSURE: 143 MMHG | TEMPERATURE: 97 F | HEIGHT: 63 IN | OXYGEN SATURATION: 94 % | HEART RATE: 69 BPM | BODY MASS INDEX: 41.29 KG/M2

## 2024-05-14 DIAGNOSIS — E66.01 CLASS 3 SEVERE OBESITY DUE TO EXCESS CALORIES WITH SERIOUS COMORBIDITY IN ADULT, UNSPECIFIED BMI (MULTI): ICD-10-CM

## 2024-05-14 DIAGNOSIS — I10 BENIGN ESSENTIAL HYPERTENSION: ICD-10-CM

## 2024-05-14 DIAGNOSIS — R06.02 SOB (SHORTNESS OF BREATH): ICD-10-CM

## 2024-05-14 DIAGNOSIS — E78.2 HYPERLIPEMIA, MIXED: ICD-10-CM

## 2024-05-14 DIAGNOSIS — I10 BENIGN ESSENTIAL HYPERTENSION: Primary | ICD-10-CM

## 2024-05-14 DIAGNOSIS — M25.561 ACUTE PAIN OF RIGHT KNEE: ICD-10-CM

## 2024-05-14 DIAGNOSIS — M17.0 PRIMARY OSTEOARTHRITIS OF BOTH KNEES: ICD-10-CM

## 2024-05-14 LAB
ALBUMIN SERPL BCP-MCNC: 4.1 G/DL (ref 3.4–5)
ALP SERPL-CCNC: 62 U/L (ref 33–136)
ALT SERPL W P-5'-P-CCNC: 6 U/L (ref 7–45)
ANION GAP SERPL CALC-SCNC: 14 MMOL/L (ref 10–20)
AST SERPL W P-5'-P-CCNC: 13 U/L (ref 9–39)
BASOPHILS # BLD AUTO: 0.05 X10*3/UL (ref 0–0.1)
BASOPHILS NFR BLD AUTO: 0.6 %
BILIRUB SERPL-MCNC: 0.6 MG/DL (ref 0–1.2)
BNP SERPL-MCNC: 470 PG/ML (ref 0–99)
BUN SERPL-MCNC: 16 MG/DL (ref 6–23)
CALCIUM SERPL-MCNC: 9.6 MG/DL (ref 8.6–10.6)
CHLORIDE SERPL-SCNC: 103 MMOL/L (ref 98–107)
CHOLEST SERPL-MCNC: 183 MG/DL (ref 0–199)
CHOLESTEROL/HDL RATIO: 2.3
CO2 SERPL-SCNC: 27 MMOL/L (ref 21–32)
CREAT SERPL-MCNC: 0.99 MG/DL (ref 0.5–1.05)
EGFRCR SERPLBLD CKD-EPI 2021: 60 ML/MIN/1.73M*2
EOSINOPHIL # BLD AUTO: 0.07 X10*3/UL (ref 0–0.4)
EOSINOPHIL NFR BLD AUTO: 0.8 %
ERYTHROCYTE [DISTWIDTH] IN BLOOD BY AUTOMATED COUNT: 13.1 % (ref 11.5–14.5)
EST. AVERAGE GLUCOSE BLD GHB EST-MCNC: 97 MG/DL
GLUCOSE SERPL-MCNC: 98 MG/DL (ref 74–99)
HBA1C MFR BLD: 5 %
HCT VFR BLD AUTO: 37.4 % (ref 36–46)
HDLC SERPL-MCNC: 80 MG/DL
HGB BLD-MCNC: 11.4 G/DL (ref 12–16)
IMM GRANULOCYTES # BLD AUTO: 0.05 X10*3/UL (ref 0–0.5)
IMM GRANULOCYTES NFR BLD AUTO: 0.6 % (ref 0–0.9)
LDLC SERPL CALC-MCNC: 81 MG/DL
LYMPHOCYTES # BLD AUTO: 1.64 X10*3/UL (ref 0.8–3)
LYMPHOCYTES NFR BLD AUTO: 18.5 %
MAGNESIUM SERPL-MCNC: 1.99 MG/DL (ref 1.6–2.4)
MCH RBC QN AUTO: 30.4 PG (ref 26–34)
MCHC RBC AUTO-ENTMCNC: 30.5 G/DL (ref 32–36)
MCV RBC AUTO: 100 FL (ref 80–100)
MONOCYTES # BLD AUTO: 0.72 X10*3/UL (ref 0.05–0.8)
MONOCYTES NFR BLD AUTO: 8.1 %
NEUTROPHILS # BLD AUTO: 6.35 X10*3/UL (ref 1.6–5.5)
NEUTROPHILS NFR BLD AUTO: 71.4 %
NON HDL CHOLESTEROL: 103 MG/DL (ref 0–149)
NRBC BLD-RTO: 0 /100 WBCS (ref 0–0)
PLATELET # BLD AUTO: 275 X10*3/UL (ref 150–450)
POTASSIUM SERPL-SCNC: 4.6 MMOL/L (ref 3.5–5.3)
PROT SERPL-MCNC: 6.9 G/DL (ref 6.4–8.2)
RBC # BLD AUTO: 3.75 X10*6/UL (ref 4–5.2)
SODIUM SERPL-SCNC: 139 MMOL/L (ref 136–145)
TRIGL SERPL-MCNC: 111 MG/DL (ref 0–149)
TSH SERPL-ACNC: 4.58 MIU/L (ref 0.44–3.98)
VLDL: 22 MG/DL (ref 0–40)
WBC # BLD AUTO: 8.9 X10*3/UL (ref 4.4–11.3)

## 2024-05-14 PROCEDURE — 93000 ELECTROCARDIOGRAM COMPLETE: CPT | Performed by: INTERNAL MEDICINE

## 2024-05-14 PROCEDURE — 1160F RVW MEDS BY RX/DR IN RCRD: CPT | Performed by: INTERNAL MEDICINE

## 2024-05-14 PROCEDURE — 3078F DIAST BP <80 MM HG: CPT | Performed by: INTERNAL MEDICINE

## 2024-05-14 PROCEDURE — 85025 COMPLETE CBC W/AUTO DIFF WBC: CPT

## 2024-05-14 PROCEDURE — 3077F SYST BP >= 140 MM HG: CPT | Performed by: INTERNAL MEDICINE

## 2024-05-14 PROCEDURE — G2211 COMPLEX E/M VISIT ADD ON: HCPCS | Performed by: INTERNAL MEDICINE

## 2024-05-14 PROCEDURE — 83880 ASSAY OF NATRIURETIC PEPTIDE: CPT

## 2024-05-14 PROCEDURE — 83735 ASSAY OF MAGNESIUM: CPT

## 2024-05-14 PROCEDURE — 84443 ASSAY THYROID STIM HORMONE: CPT

## 2024-05-14 PROCEDURE — 83036 HEMOGLOBIN GLYCOSYLATED A1C: CPT

## 2024-05-14 PROCEDURE — 1036F TOBACCO NON-USER: CPT | Performed by: INTERNAL MEDICINE

## 2024-05-14 PROCEDURE — 99214 OFFICE O/P EST MOD 30 MIN: CPT | Performed by: INTERNAL MEDICINE

## 2024-05-14 PROCEDURE — 36415 COLL VENOUS BLD VENIPUNCTURE: CPT

## 2024-05-14 PROCEDURE — 84439 ASSAY OF FREE THYROXINE: CPT

## 2024-05-14 PROCEDURE — 1159F MED LIST DOCD IN RCRD: CPT | Performed by: INTERNAL MEDICINE

## 2024-05-14 PROCEDURE — 80061 LIPID PANEL: CPT

## 2024-05-14 PROCEDURE — 80053 COMPREHEN METABOLIC PANEL: CPT

## 2024-05-14 RX ORDER — BISOPROLOL FUMARATE 5 MG/1
2.5 TABLET, FILM COATED ORAL DAILY
Qty: 45 TABLET | Refills: 3 | Status: SHIPPED | OUTPATIENT
Start: 2024-05-14 | End: 2024-06-06 | Stop reason: ALTCHOICE

## 2024-05-14 RX ORDER — TELMISARTAN AND HYDROCHLORTHIAZIDE 40; 12.5 MG/1; MG/1
1 TABLET ORAL DAILY
COMMUNITY
End: 2024-05-14 | Stop reason: SDUPTHER

## 2024-05-14 RX ORDER — TELMISARTAN AND HYDROCHLORTHIAZIDE 40; 12.5 MG/1; MG/1
1 TABLET ORAL DAILY
Qty: 90 TABLET | Refills: 3 | Status: SHIPPED | OUTPATIENT
Start: 2024-05-14 | End: 2024-06-06 | Stop reason: ALTCHOICE

## 2024-05-14 NOTE — PROGRESS NOTES
Subjective   Patient ID: Anna Freedman is a 73 y.o. female who presents for Shortness of Breath (SOB on exertion ) and Wheezing.    Assessment/Plan     Problem List Items Addressed This Visit       Benign essential hypertension - Primary    Relevant Medications    telmisartan-hydrochlorothiazid (MIcarDIS HCT) 40-12.5 mg tablet    bisoprolol (Zebeta) 5 mg tablet    Other Relevant Orders    Referral to Ophthalmology    CBC and Auto Differential    Comprehensive Metabolic Panel    Hemoglobin A1C    Lipid Panel    TSH with reflex to Free T4 if abnormal    Magnesium    B-type natriuretic peptide    ECG 12 lead (Clinic Performed) (Completed)    Hyperlipemia, mixed    Relevant Orders    Referral to Ophthalmology    CBC and Auto Differential    Comprehensive Metabolic Panel    Hemoglobin A1C    Lipid Panel    TSH with reflex to Free T4 if abnormal    Magnesium    B-type natriuretic peptide    Class 3 severe obesity due to excess calories with serious comorbidity in adult (Multi)    Relevant Orders    Referral to Ophthalmology    CBC and Auto Differential    Comprehensive Metabolic Panel    Hemoglobin A1C    Lipid Panel    TSH with reflex to Free T4 if abnormal    Magnesium    B-type natriuretic peptide    Acute pain of right knee    Relevant Orders    Referral to Ophthalmology    CBC and Auto Differential    Comprehensive Metabolic Panel    Hemoglobin A1C    Lipid Panel    TSH with reflex to Free T4 if abnormal    Magnesium    B-type natriuretic peptide    Primary osteoarthritis of both knees    Relevant Orders    Referral to Ophthalmology    CBC and Auto Differential    Comprehensive Metabolic Panel    Hemoglobin A1C    Lipid Panel    TSH with reflex to Free T4 if abnormal    Magnesium    B-type natriuretic peptide     Other Visit Diagnoses       SOB (shortness of breath)        Relevant Orders    Referral to Ophthalmology    CBC and Auto Differential    Comprehensive Metabolic Panel    Hemoglobin A1C    Lipid Panel     TSH with reflex to Free T4 if abnormal    Magnesium    B-type natriuretic peptide          Patient was evaluated today, problem list was reviewed, problems and concerns addressed, Rx list reviewed and updated, lab and tests were noted and reviewed. Life style changes were discussed, always it works better if we eat plant based diet and plenty of fibres and roughage. Consume adequate amount of water and avoid alcohol, light to moderate physical activities and stress reduction are always beneficial for ongoing physical well being. Do not forget to have 6 to 7 hours of sleep regularly and avoid late night christopher screen exposure.    HPI  73-year-old patient recently  her  passed back up in [place]    Today came complaining the PTSD associate with anxiety depression insomnia she does not like to stay here she want to go back to the [place]    Negative for crying or suicidal Moore ideations or chemical abuse    Today came complaining of the multiple joint aches and pains specially affecting the shoulder both hip and both    Patient recently evaluated in [facility] given beta-blocker and myocarditis    Had a bradycardia    Complaining of the tightness across the chest shortness of breath family history of heart disease discussed with the patient discussed with patient's son refer patient to cardiologist EKG done abnormal sent for the nuclear stress test there is a Lexiscan    Sent for the chest x-ray EKG was not done discussed with the patient check the CBC CMP TSH lipid CPK BNP troponin level if problems continue does not get any better go to the emergency room right away add on aspirin 81 mg a day  History reviewed. No pertinent past medical history.  Past Surgical History:   Procedure Laterality Date    TOTAL KNEE ARTHROPLASTY  03/22/2016    Knee Replacement     No Known Allergies  Current Outpatient Medications   Medication Sig Dispense Refill    bisoprolol (Zebeta) 5 mg tablet Take 0.5 tablets (2.5 mg) by mouth  "once daily. 45 tablet 3    BISOPROLOL FUMARATE ORAL Take by mouth.      telmisartan-hydrochlorothiazid (MIcarDIS HCT) 40-12.5 mg tablet Take 1 tablet by mouth once daily. 90 tablet 3     No current facility-administered medications for this visit.     Family History   Problem Relation Name Age of Onset    No Known Problems Mother      No Known Problems Father       Social History     Socioeconomic History    Marital status:      Spouse name: None    Number of children: None    Years of education: None    Highest education level: None   Occupational History    None   Tobacco Use    Smoking status: Never    Smokeless tobacco: Never   Vaping Use    Vaping status: Never Used   Substance and Sexual Activity    Alcohol use: Never    Drug use: Never    Sexual activity: None   Other Topics Concern    None   Social History Narrative    None     Social Determinants of Health     Financial Resource Strain: Not on file   Food Insecurity: Not on file   Transportation Needs: Not on file   Physical Activity: Not on file   Stress: Not on file   Social Connections: Not on file   Intimate Partner Violence: Not on file   Housing Stability: Not on file     Immunization History   Administered Date(s) Administered    Influenza, High Dose Seasonal, Preservative Free 09/30/2015, 10/05/2016    Influenza, Unspecified 10/05/2016    Influenza, seasonal, injectable 09/24/2014    Pneumococcal conjugate vaccine, 13-valent (PREVNAR 13) 09/30/2015    Pneumococcal polysaccharide vaccine, 23-valent, age 2 years and older (PNEUMOVAX 23) 12/20/2014    Td vaccine, age 7 years and older (TENIVAC) 04/04/2016       Review of Systems  Review of systems is otherwise negative unless stated above or in history of present illness.    Objective   Visit Vitals  /72 (BP Location: Right arm, Patient Position: Sitting, BP Cuff Size: Large adult)   Pulse 69   Temp 36.1 °C (97 °F)   Ht 1.6 m (5' 3\")   Wt 106 kg (233 lb)   SpO2 94%   BMI 41.27 kg/m² "   Smoking Status Never   BSA 2.17 m²     Physical Exam  Constitutional: Obesity BMI 41     General: not in acute distress.   HENT:      Head: Normocephalic and atraumatic.      Nose: Nose normal.   Eyes:      Extraocular Movements: Extraocular movements intact.      Conjunctiva/sclera: Conjunctivae normal.   Cardiovascular: Heart murmur     Rate and Rhythm: Normal rate ,  No M/R/G  Pulmonary: Rhonchi     Effort: Pulmonary effort is normal.      Breath sounds: Normal, Bilat Equal AE  Skin:     General: Skin is warm.   Neurological: Neuralgia     Mental Status: He is alert and oriented to person, place, and time.   Psychiatric:   Anxiety depression separation anxiety loss of    mood and Affect: Mood normal.         Behavior: Behavior normal.   Musculoskeletal arthritis of spine and hip  FROM in all extremitirs,  Joint-no swelling or tenderness    No visits with results within 4 Month(s) from this visit.   Latest known visit with results is:   Orders Only on 10/13/2023   Component Date Value Ref Range Status    [facility] HGB A1C 10/13/2023 5.0  % Final    [facility] TSH 10/13/2023 2.96  0.55 - 4.78 uIU/ml Final       Radiology: Reviewed imaging in powerchart.  ECG 12 lead (Clinic Performed)    Result Date: 5/14/2024  Abnormal EKG right bundle branch block associated with the nonspecific ST-T changes in anterolateral leads referred to cardiology for Lexiscan rule out ischemia        Charting was completed using voice recognition technology and may include unintended errors.

## 2024-05-15 LAB — T4 FREE SERPL-MCNC: 1.18 NG/DL (ref 0.78–1.48)

## 2024-05-16 ENCOUNTER — TELEPHONE (OUTPATIENT)
Dept: PRIMARY CARE | Facility: CLINIC | Age: 74
End: 2024-05-16
Payer: COMMERCIAL

## 2024-05-16 DIAGNOSIS — I50.9 CONGESTIVE HEART FAILURE, UNSPECIFIED HF CHRONICITY, UNSPECIFIED HEART FAILURE TYPE (MULTI): ICD-10-CM

## 2024-05-16 DIAGNOSIS — R79.89 HIGH SERUM THYROID STIMULATING HORMONE (TSH): ICD-10-CM

## 2024-05-16 RX ORDER — LEVOTHYROXINE SODIUM 25 UG/1
12.5 TABLET ORAL DAILY
Qty: 15 TABLET | Refills: 2 | Status: SHIPPED | OUTPATIENT
Start: 2024-05-16 | End: 2024-06-03 | Stop reason: SDUPTHER

## 2024-05-16 RX ORDER — FUROSEMIDE 40 MG/1
40 TABLET ORAL DAILY
Qty: 30 TABLET | Refills: 0 | Status: SHIPPED | OUTPATIENT
Start: 2024-05-16 | End: 2024-06-06 | Stop reason: ALTCHOICE

## 2024-05-16 RX ORDER — POTASSIUM CHLORIDE 20 MEQ/1
20 TABLET, EXTENDED RELEASE ORAL DAILY
Qty: 30 TABLET | Refills: 0 | Status: SHIPPED | OUTPATIENT
Start: 2024-05-16 | End: 2024-06-06 | Stop reason: ALTCHOICE

## 2024-05-16 NOTE — TELEPHONE ENCOUNTER
----- Message from Mari Gooden MD sent at 5/16/2024 11:07 AM EDT -----  Cardiomegaly similar to the prior study.     Interval development of mild bilateral interstitial prominence possibly indicating mild pulmonary vascular congestion/interstitial edema.     Lasix 40 mg a day potassium 20 mEq a day for a month supply for both advised to see Dr. Grant cardiologist

## 2024-05-16 NOTE — TELEPHONE ENCOUNTER
----- Message from Mari Gooden MD sent at 5/16/2024 11:00 AM EDT -----  Anemia thyroid dysfunction mild congestive heart failure advised    Slow Fe 1 a day    Cardiology to follow Dr. Grant  Synthroid 12.5 mg twice a week follow-up 4 weeks

## 2024-05-24 DIAGNOSIS — R79.89 HIGH SERUM THYROID STIMULATING HORMONE (TSH): ICD-10-CM

## 2024-06-03 RX ORDER — LEVOTHYROXINE SODIUM 25 UG/1
12.5 TABLET ORAL DAILY
Qty: 15 TABLET | Refills: 2 | Status: SHIPPED | OUTPATIENT
Start: 2024-06-03 | End: 2024-06-04 | Stop reason: SDUPTHER

## 2024-06-04 ENCOUNTER — OFFICE VISIT (OUTPATIENT)
Dept: PRIMARY CARE | Facility: CLINIC | Age: 74
End: 2024-06-04
Payer: COMMERCIAL

## 2024-06-04 VITALS
HEART RATE: 56 BPM | OXYGEN SATURATION: 91 % | HEIGHT: 63 IN | TEMPERATURE: 97.1 F | WEIGHT: 238 LBS | SYSTOLIC BLOOD PRESSURE: 162 MMHG | DIASTOLIC BLOOD PRESSURE: 74 MMHG | BODY MASS INDEX: 42.17 KG/M2

## 2024-06-04 DIAGNOSIS — I50.33 ACUTE ON CHRONIC DIASTOLIC CONGESTIVE HEART FAILURE (MULTI): Primary | ICD-10-CM

## 2024-06-04 DIAGNOSIS — I10 BENIGN ESSENTIAL HYPERTENSION: ICD-10-CM

## 2024-06-04 DIAGNOSIS — M25.561 ACUTE PAIN OF RIGHT KNEE: ICD-10-CM

## 2024-06-04 DIAGNOSIS — E66.01 CLASS 3 SEVERE OBESITY DUE TO EXCESS CALORIES WITH SERIOUS COMORBIDITY IN ADULT, UNSPECIFIED BMI (MULTI): ICD-10-CM

## 2024-06-04 DIAGNOSIS — F41.1 GAD (GENERALIZED ANXIETY DISORDER): ICD-10-CM

## 2024-06-04 DIAGNOSIS — R79.89 HIGH SERUM THYROID STIMULATING HORMONE (TSH): ICD-10-CM

## 2024-06-04 DIAGNOSIS — E78.2 HYPERLIPEMIA, MIXED: ICD-10-CM

## 2024-06-04 DIAGNOSIS — I50.9 CONGESTIVE HEART FAILURE, UNSPECIFIED HF CHRONICITY, UNSPECIFIED HEART FAILURE TYPE (MULTI): ICD-10-CM

## 2024-06-04 PROBLEM — M17.0 PRIMARY OSTEOARTHRITIS OF BOTH KNEES: Status: RESOLVED | Noted: 2023-09-25 | Resolved: 2024-06-04

## 2024-06-04 PROCEDURE — 1160F RVW MEDS BY RX/DR IN RCRD: CPT | Performed by: INTERNAL MEDICINE

## 2024-06-04 PROCEDURE — 96372 THER/PROPH/DIAG INJ SC/IM: CPT | Performed by: INTERNAL MEDICINE

## 2024-06-04 PROCEDURE — 1159F MED LIST DOCD IN RCRD: CPT | Performed by: INTERNAL MEDICINE

## 2024-06-04 PROCEDURE — 3077F SYST BP >= 140 MM HG: CPT | Performed by: INTERNAL MEDICINE

## 2024-06-04 PROCEDURE — 99214 OFFICE O/P EST MOD 30 MIN: CPT | Performed by: INTERNAL MEDICINE

## 2024-06-04 PROCEDURE — 1036F TOBACCO NON-USER: CPT | Performed by: INTERNAL MEDICINE

## 2024-06-04 PROCEDURE — 3078F DIAST BP <80 MM HG: CPT | Performed by: INTERNAL MEDICINE

## 2024-06-04 RX ORDER — ALPRAZOLAM 0.25 MG/1
0.25 TABLET ORAL 3 TIMES DAILY PRN
Qty: 15 TABLET | Refills: 0 | Status: SHIPPED | OUTPATIENT
Start: 2024-06-04 | End: 2024-06-06 | Stop reason: WASHOUT

## 2024-06-04 RX ORDER — TORSEMIDE 20 MG/1
20 TABLET ORAL DAILY
Qty: 30 TABLET | Refills: 0 | Status: SHIPPED | OUTPATIENT
Start: 2024-06-04

## 2024-06-04 RX ORDER — LEVOTHYROXINE SODIUM 25 UG/1
25 TABLET ORAL DAILY
Qty: 90 TABLET | Refills: 3 | Status: SHIPPED | OUTPATIENT
Start: 2024-06-04

## 2024-06-04 RX ORDER — ATORVASTATIN CALCIUM 10 MG/1
10 TABLET, FILM COATED ORAL DAILY
Qty: 90 TABLET | Refills: 3 | Status: SHIPPED | OUTPATIENT
Start: 2024-06-04

## 2024-06-04 RX ORDER — POTASSIUM CHLORIDE 750 MG/1
10 TABLET, FILM COATED, EXTENDED RELEASE ORAL DAILY
Qty: 30 TABLET | Refills: 0 | Status: SHIPPED | OUTPATIENT
Start: 2024-06-04

## 2024-06-04 RX ORDER — OLMESARTAN MEDOXOMIL AND HYDROCHLOROTHIAZIDE 40/25 40; 25 MG/1; MG/1
1 TABLET ORAL DAILY
Qty: 90 TABLET | Refills: 3 | Status: SHIPPED | OUTPATIENT
Start: 2024-06-04

## 2024-06-04 RX ORDER — FUROSEMIDE 10 MG/ML
40 INJECTION INTRAMUSCULAR; INTRAVENOUS ONCE
Status: COMPLETED | OUTPATIENT
Start: 2024-06-04 | End: 2024-06-04

## 2024-06-04 RX ADMIN — FUROSEMIDE 40 MG: 10 INJECTION INTRAMUSCULAR; INTRAVENOUS at 11:30

## 2024-06-04 NOTE — PROGRESS NOTES
Subjective   Patient ID: Anna Freedman is a 73 y.o. female who presents for Edema (face) and Wheezing.    Assessment/Plan     Problem List Items Addressed This Visit       Benign essential hypertension     Patients BP readings reviewed and addressed, as we age our arteries turn stiffer and less elastic. Restricting salt consumption and staying physically fit with regular exercise regimen is the only way to keep our vasculature less tonic. Studies have shown that keeping ideal body wt, exercise routine about 140 to 150 minutes a week, eating variety of plant based diet and drinking plentiful water are quite helpful. Monitor BP twice or once a week at home and bring log to be reviewed by me. Uncontrolled BP has long term consequences including heart failure, myocardial infarction, accelerated atherosclerosis and kidney dysfunction. Therapy reviewed and explained.           Relevant Medications    olmesartan-hydrochlorothiazide (Benicar HCT) 40-25 mg tablet    Hyperlipemia, mixed    Relevant Medications    atorvastatin (Lipitor) 10 mg tablet    Class 3 severe obesity due to excess calories with serious comorbidity in adult (Multi)     I spent <15 minutes face to face with individual providing recommendations for nutrition choices and exercise plan to help achieve weight reduction goals. Obesity is systemic disorder and it can bring devastating morbidities in furture. It is a matter of calorie gain and loss, keeping bodybank in negative calorie balance mode is the way to sustain weight loss.Diet has a big role in reducing excess body wt. Scheduled and well planned meals and food intake with watchfulness and understanding of calorie portion and distribution is key to understand. Bariatric surgery is another option if sustained wt loss is not achieved and faced with one or more comorbidities with morbid obesity. Weigh yourself twice a week to understand and follow wt loss goals.           Acute pain of right knee    High  serum thyroid stimulating hormone (TSH)    Relevant Medications    levothyroxine (Synthroid) 25 mcg tablet    Acute on chronic diastolic congestive heart failure (Multi) - Primary     Pt has CHF, diastolic or systolic were specified, usually three times  a week weight monitoring, salt restriction up to 2 GM Na diet, fluid restriction and continuation of therapy as recommended to be continued. HOB can be kept somewhat elevated at night. Any dyspnea or more then 5 lb weight gain or leg swelling need to be notified, please call if any of above sympts happen and pt will be addressed if possible on outpatient setting. Light exercises are always beneficial with fresh air and deep breathing exercises. Please follow up with us as directed.          Relevant Medications    potassium chloride CR (Klor-Con) 10 mEq ER tablet    torsemide (Demadex) 20 mg tablet    furosemide (Lasix) injection 40 mg (Start on 6/4/2024 11:30 AM)     Other Visit Diagnoses       Congestive heart failure, unspecified HF chronicity, unspecified heart failure type (Multi)        JONES (generalized anxiety disorder)        Relevant Medications    ALPRAZolam (Xanax) 0.25 mg tablet            HPI 73-year-old patient have hypertension hyperlipidemia hypothyroidism obesity allergy asthma acid reflux arthritis complains of cough congestion shortness of breath onset acutely duration 4 weeks progressed acutely aggravating factor uncontrolled hypertension moderate stress associated with biventricular congestive heart failure underwent for the nuclear stress test was negative 2D echo of the heart and chest x-ray BNP suggestive of acute congestive heart failure advised hospitalization patient refused    Hypertension Benicar in the morning    Hypothyroidism Synthroid in the morning    Hyperlipidemia Lipitor at night    Anxiety Xanax half a tablet at night    CHF continue Lasix potassium magnesium see given patient's a Demadex given patient Lasix intramuscular in the  office review Lexiscan with the patient's and family follow-up tomorrow repeat BMP BNP chest x-ray tomorrow follow-up  History reviewed. No pertinent past medical history.  Past Surgical History:   Procedure Laterality Date    TOTAL KNEE ARTHROPLASTY  03/22/2016    Knee Replacement     No Known Allergies  Current Outpatient Medications   Medication Sig Dispense Refill    bisoprolol (Zebeta) 5 mg tablet Take 0.5 tablets (2.5 mg) by mouth once daily. 45 tablet 3    furosemide (Lasix) 40 mg tablet Take 1 tablet (40 mg) by mouth once daily. 30 tablet 0    potassium chloride CR (Klor-Con M20) 20 mEq ER tablet Take 1 tablet (20 mEq) by mouth once daily. Do not crush or chew. 30 tablet 0    telmisartan-hydrochlorothiazid (MIcarDIS HCT) 40-12.5 mg tablet Take 1 tablet by mouth once daily. 90 tablet 3    ALPRAZolam (Xanax) 0.25 mg tablet Take 1 tablet (0.25 mg) by mouth 3 times a day as needed for anxiety. 15 tablet 0    atorvastatin (Lipitor) 10 mg tablet Take 1 tablet (10 mg) by mouth once daily. 90 tablet 3    levothyroxine (Synthroid) 25 mcg tablet Take 1 tablet (25 mcg) by mouth early in the morning.. Take on an empty stomach at the same time each day, either 30 to 60 minutes prior to breakfast 90 tablet 3    olmesartan-hydrochlorothiazide (Benicar HCT) 40-25 mg tablet Take 1 tablet by mouth once daily. 90 tablet 3    potassium chloride CR (Klor-Con) 10 mEq ER tablet Take 1 tablet (10 mEq) by mouth once daily. Do not crush, chew, or split. 30 tablet 0    torsemide (Demadex) 20 mg tablet Take 1 tablet (20 mg) by mouth once daily. 30 tablet 0     Current Facility-Administered Medications   Medication Dose Route Frequency Provider Last Rate Last Admin    furosemide (Lasix) injection 40 mg  40 mg intramuscular Once Mari Gooden MD         Family History   Problem Relation Name Age of Onset    No Known Problems Mother      No Known Problems Father       Social History     Socioeconomic History    Marital status:  "     Spouse name: None    Number of children: None    Years of education: None    Highest education level: None   Occupational History    None   Tobacco Use    Smoking status: Never    Smokeless tobacco: Never   Vaping Use    Vaping status: Never Used   Substance and Sexual Activity    Alcohol use: Never    Drug use: Never    Sexual activity: None   Other Topics Concern    None   Social History Narrative    None     Social Determinants of Health     Financial Resource Strain: Not on file   Food Insecurity: Not on file   Transportation Needs: Not on file   Physical Activity: Not on file   Stress: Not on file   Social Connections: Not on file   Intimate Partner Violence: Not on file   Housing Stability: Not on file     Immunization History   Administered Date(s) Administered    Influenza, High Dose Seasonal, Preservative Free 09/30/2015, 10/05/2016    Influenza, Unspecified 10/05/2016    Influenza, seasonal, injectable 09/24/2014    Pneumococcal conjugate vaccine, 13-valent (PREVNAR 13) 09/30/2015    Pneumococcal polysaccharide vaccine, 23-valent, age 2 years and older (PNEUMOVAX 23) 12/20/2014    Td vaccine, age 7 years and older (TENIVAC) 04/04/2016       Review of Systems  Review of systems is otherwise negative unless stated above or in history of present illness.    Objective   Visit Vitals  /74 (BP Location: Right arm, Patient Position: Sitting, BP Cuff Size: Large adult)   Pulse 56   Temp 36.2 °C (97.1 °F)   Ht 1.6 m (5' 3\")   Wt 108 kg (238 lb)   SpO2 91%   BMI 42.16 kg/m²   Smoking Status Never   BSA 2.19 m²     Physical Exam  Constitutional: BMI 42     General: not in acute distress.   HENT: JVD plus     Head: Normocephalic and atraumatic.      Nose: Nose normal.   Eyes:      Extraocular Movements: Extraocular movements intact.      Conjunctiva/sclera: Conjunctivae normal.   Cardiovascular: S1-S2-S3     Rate and Rhythm: Normal rate ,  No M/R/G  Pulmonary: Crackles rales rhonchi     Effort: " Pulmonary effort is normal.      Breath sounds: Normal, Bilat Equal AE  Skin:     General: Skin is warm.   Neurological: Neuralgia     Mental Status: He is alert and oriented to person, place, and time.   Psychiatric:         Mood and Affect: Mood normal.         Behavior: Behavior normal.   Musculoskeletal arthritis  FROM in all extremitirs,  Joint-no swelling or tenderness    Lab on 05/14/2024   Component Date Value Ref Range Status    WBC 05/14/2024 8.9  4.4 - 11.3 x10*3/uL Final    nRBC 05/14/2024 0.0  0.0 - 0.0 /100 WBCs Final    RBC 05/14/2024 3.75 (L)  4.00 - 5.20 x10*6/uL Final    Hemoglobin 05/14/2024 11.4 (L)  12.0 - 16.0 g/dL Final    Hematocrit 05/14/2024 37.4  36.0 - 46.0 % Final    MCV 05/14/2024 100  80 - 100 fL Final    MCH 05/14/2024 30.4  26.0 - 34.0 pg Final    MCHC 05/14/2024 30.5 (L)  32.0 - 36.0 g/dL Final    RDW 05/14/2024 13.1  11.5 - 14.5 % Final    Platelets 05/14/2024 275  150 - 450 x10*3/uL Final    Neutrophils % 05/14/2024 71.4  40.0 - 80.0 % Final    Immature Granulocytes %, Automated 05/14/2024 0.6  0.0 - 0.9 % Final    Lymphocytes % 05/14/2024 18.5  13.0 - 44.0 % Final    Monocytes % 05/14/2024 8.1  2.0 - 10.0 % Final    Eosinophils % 05/14/2024 0.8  0.0 - 6.0 % Final    Basophils % 05/14/2024 0.6  0.0 - 2.0 % Final    Neutrophils Absolute 05/14/2024 6.35 (H)  1.60 - 5.50 x10*3/uL Final    Immature Granulocytes Absolute, Au* 05/14/2024 0.05  0.00 - 0.50 x10*3/uL Final    Lymphocytes Absolute 05/14/2024 1.64  0.80 - 3.00 x10*3/uL Final    Monocytes Absolute 05/14/2024 0.72  0.05 - 0.80 x10*3/uL Final    Eosinophils Absolute 05/14/2024 0.07  0.00 - 0.40 x10*3/uL Final    Basophils Absolute 05/14/2024 0.05  0.00 - 0.10 x10*3/uL Final    Glucose 05/14/2024 98  74 - 99 mg/dL Final    Sodium 05/14/2024 139  136 - 145 mmol/L Final    Potassium 05/14/2024 4.6  3.5 - 5.3 mmol/L Final    Chloride 05/14/2024 103  98 - 107 mmol/L Final    Bicarbonate 05/14/2024 27  21 - 32 mmol/L Final    Anion  Gap 05/14/2024 14  10 - 20 mmol/L Final    Urea Nitrogen 05/14/2024 16  6 - 23 mg/dL Final    Creatinine 05/14/2024 0.99  0.50 - 1.05 mg/dL Final    eGFR 05/14/2024 60 (L)  >60 mL/min/1.73m*2 Final    Calcium 05/14/2024 9.6  8.6 - 10.6 mg/dL Final    Albumin 05/14/2024 4.1  3.4 - 5.0 g/dL Final    Alkaline Phosphatase 05/14/2024 62  33 - 136 U/L Final    Total Protein 05/14/2024 6.9  6.4 - 8.2 g/dL Final    AST 05/14/2024 13  9 - 39 U/L Final    Bilirubin, Total 05/14/2024 0.6  0.0 - 1.2 mg/dL Final    ALT 05/14/2024 6 (L)  7 - 45 U/L Final    Hemoglobin A1C 05/14/2024 5.0  see below % Final    Estimated Average Glucose 05/14/2024 97  Not Established mg/dL Final    Cholesterol 05/14/2024 183  0 - 199 mg/dL Final    HDL-Cholesterol 05/14/2024 80.0  mg/dL Final    Cholesterol/HDL Ratio 05/14/2024 2.3   Final    LDL Calculated 05/14/2024 81  <=99 mg/dL Final    VLDL 05/14/2024 22  0 - 40 mg/dL Final    Triglycerides 05/14/2024 111  0 - 149 mg/dL Final    Non HDL Cholesterol 05/14/2024 103  0 - 149 mg/dL Final    Thyroid Stimulating Hormone 05/14/2024 4.58 (H)  0.44 - 3.98 mIU/L Final    Magnesium 05/14/2024 1.99  1.60 - 2.40 mg/dL Final    BNP 05/14/2024 470 (H)  0 - 99 pg/mL Final    Thyroxine, Free 05/14/2024 1.18  0.78 - 1.48 ng/dL Final       Radiology: Reviewed imaging in powerchart.  ECG 12 lead (Clinic Performed)    Result Date: 5/14/2024  Abnormal EKG right bundle branch block associated with the nonspecific ST-T changes in anterolateral leads referred to cardiology for Lexiscan rule out ischemia    XR chest 2 views    Result Date: 5/14/2024  Chest PA and lateral  5/14/2024 9:10 AM CDT Prior Study: 9/26/2023 History:  SHORTNESS OF BREATH Tech notes: WHAT SYMPTOMS ARE YOU EXPERIENCING?; SOB, CHF Findings: There is cardiomegaly similar to the prior study. There is prominence of the central pulmonary vasculature. Since the prior study, the patient has developed mild bilateral interstitial prominence possibly  indicating mild pulmonary vascular congestion/interstitial edema. The costophrenic angles are not substantially blunted. There is no definite pneumothorax. There are aortic calcifications. The thoracic aorta is prominent and tortuous; ectasia or aneurysmal dilatation cannot be excluded. The osseous structures are osteopenic. There are degenerative changes of the thoracic spine. Impression: Cardiomegaly similar to the prior study. Interval development of mild bilateral interstitial prominence possibly indicating mild pulmonary vascular congestion/interstitial edema. Electronically signed by:  Dougie Malcolm MD  05/15/2024 12:56 PM EDT Workstation: [id number] Technologist:  DANIEL Dictated By:   DOUGIE MALCOLM MD Signed By:     DOUGIE MALCOLM MD Signed Out:    05/15/24 12:56:11        Charting was completed using voice recognition technology and may include unintended errors.

## 2024-06-05 ENCOUNTER — APPOINTMENT (OUTPATIENT)
Dept: PRIMARY CARE | Facility: CLINIC | Age: 74
End: 2024-06-05
Payer: COMMERCIAL

## 2024-06-06 ENCOUNTER — TELEPHONE (OUTPATIENT)
Dept: PRIMARY CARE | Facility: CLINIC | Age: 74
End: 2024-06-06

## 2024-06-06 ENCOUNTER — OFFICE VISIT (OUTPATIENT)
Dept: PRIMARY CARE | Facility: CLINIC | Age: 74
End: 2024-06-06
Payer: COMMERCIAL

## 2024-06-06 VITALS
DIASTOLIC BLOOD PRESSURE: 90 MMHG | SYSTOLIC BLOOD PRESSURE: 155 MMHG | WEIGHT: 232 LBS | BODY MASS INDEX: 41.11 KG/M2 | OXYGEN SATURATION: 90 % | HEIGHT: 63 IN | TEMPERATURE: 97.3 F | HEART RATE: 57 BPM

## 2024-06-06 DIAGNOSIS — F32.1 CURRENT MODERATE EPISODE OF MAJOR DEPRESSIVE DISORDER, UNSPECIFIED WHETHER RECURRENT (MULTI): Primary | ICD-10-CM

## 2024-06-06 DIAGNOSIS — I80.9 PHLEBITIS: ICD-10-CM

## 2024-06-06 DIAGNOSIS — E79.0 ELEVATED URIC ACID IN BLOOD: ICD-10-CM

## 2024-06-06 DIAGNOSIS — E66.01 CLASS 3 SEVERE OBESITY DUE TO EXCESS CALORIES WITH SERIOUS COMORBIDITY IN ADULT, UNSPECIFIED BMI (MULTI): ICD-10-CM

## 2024-06-06 DIAGNOSIS — I50.33 ACUTE ON CHRONIC DIASTOLIC CONGESTIVE HEART FAILURE (MULTI): ICD-10-CM

## 2024-06-06 LAB
NON-UH HIE BUN/CREAT RATIO: 20.9
NON-UH HIE BUN: 23 MG/DL (ref 9–23)
NON-UH HIE CALCIUM: 9.8 MG/DL (ref 8.7–10.4)
NON-UH HIE CALCULATED OSMOLALITY: 285 MOSM/KG (ref 275–295)
NON-UH HIE CHLORIDE: 102 MMOL/L (ref 98–107)
NON-UH HIE CO2, VENOUS: 33 MMOL/L (ref 20–31)
NON-UH HIE CREATININE: 1.1 MG/DL (ref 0.5–0.8)
NON-UH HIE GFR AA: 58
NON-UH HIE GLOMERULAR FILTRATION RATE: 48 ML/MIN/1.73M?
NON-UH HIE GLUCOSE: 89 MG/DL (ref 74–106)
NON-UH HIE K: 4.9 MMOL/L (ref 3.5–5.1)
NON-UH HIE MAGNESIUM: 1.7 MG/DL (ref 1.6–2.6)
NON-UH HIE NA: 141 MMOL/L (ref 135–145)
NON-UH HIE URIC ACID: 9.2 MG/DL (ref 3.1–7.8)

## 2024-06-06 PROCEDURE — 1036F TOBACCO NON-USER: CPT | Performed by: INTERNAL MEDICINE

## 2024-06-06 PROCEDURE — 3077F SYST BP >= 140 MM HG: CPT | Performed by: INTERNAL MEDICINE

## 2024-06-06 PROCEDURE — 1160F RVW MEDS BY RX/DR IN RCRD: CPT | Performed by: INTERNAL MEDICINE

## 2024-06-06 PROCEDURE — 96372 THER/PROPH/DIAG INJ SC/IM: CPT | Performed by: INTERNAL MEDICINE

## 2024-06-06 PROCEDURE — 1159F MED LIST DOCD IN RCRD: CPT | Performed by: INTERNAL MEDICINE

## 2024-06-06 PROCEDURE — 3080F DIAST BP >= 90 MM HG: CPT | Performed by: INTERNAL MEDICINE

## 2024-06-06 PROCEDURE — 99213 OFFICE O/P EST LOW 20 MIN: CPT | Performed by: INTERNAL MEDICINE

## 2024-06-06 RX ORDER — FUROSEMIDE 10 MG/ML
40 INJECTION INTRAMUSCULAR; INTRAVENOUS ONCE
Status: COMPLETED | OUTPATIENT
Start: 2024-06-06 | End: 2024-06-06

## 2024-06-06 RX ORDER — SERTRALINE HYDROCHLORIDE 50 MG/1
50 TABLET, FILM COATED ORAL DAILY
Qty: 30 TABLET | Refills: 1 | Status: SHIPPED | OUTPATIENT
Start: 2024-06-06 | End: 2024-08-05

## 2024-06-06 RX ORDER — ALLOPURINOL 300 MG/1
300 TABLET ORAL DAILY
Qty: 90 TABLET | Refills: 1 | Status: SHIPPED | OUTPATIENT
Start: 2024-06-06

## 2024-06-06 RX ADMIN — FUROSEMIDE 40 MG: 10 INJECTION INTRAMUSCULAR; INTRAVENOUS at 11:01

## 2024-06-06 NOTE — PROGRESS NOTES
Subjective   Patient ID: Anna Freedman is a 73 y.o. female who presents for Follow-up (Lasix injection ).    Assessment/Plan     Problem List Items Addressed This Visit       Class 3 severe obesity due to excess calories with serious comorbidity in adult (Multi)    Relevant Orders    Basic metabolic panel    Magnesium    Uric Acid    Acute on chronic diastolic congestive heart failure (Multi)    Relevant Medications    furosemide (Lasix) injection 40 mg (Completed)    Current moderate episode of major depressive disorder (Multi) - Primary    Relevant Medications    sertraline (Zoloft) 50 mg tablet    Other Relevant Orders    Basic metabolic panel    Magnesium    Uric Acid    Phlebitis     Patient was evaluated today, problem list was reviewed, problems and concerns addressed, Rx list reviewed and updated, lab and tests were noted and reviewed. Life style changes were discussed, always it works better if we eat plant based diet and plenty of fibres and roughage. Consume adequate amount of water and avoid alcohol, light to moderate physical activities and stress reduction are always beneficial for ongoing physical well being. Do not forget to have 6 to 7 hours of sleep regularly and avoid late night christopher screen exposure.    HPI 73-year-old patient of anxiety depression obesity hypertension hyperlipidemia hypothyroidism chronic edema of lower extremity associated with the PND orthopnea dyspnea with help of the Lasix potassium magnesium feeling much better continue crying and depressed posttraumatic depression secondary loss of  from the heart attack    Negative for suicide    Negative for hypoxia hypoglycemia fall    Patient to have varicose vein with the phlebitis lower extremity with the pain given elastic stockings.  Given Lasix 40 intramuscular sent for the CBC BMP uric acid magnesium level today follow-up 4 weeks  History reviewed. No pertinent past medical history.  Past Surgical History:   Procedure  Laterality Date    TOTAL KNEE ARTHROPLASTY  03/22/2016    Knee Replacement     No Known Allergies  Current Outpatient Medications   Medication Sig Dispense Refill    atorvastatin (Lipitor) 10 mg tablet Take 1 tablet (10 mg) by mouth once daily. 90 tablet 3    levothyroxine (Synthroid) 25 mcg tablet Take 1 tablet (25 mcg) by mouth early in the morning.. Take on an empty stomach at the same time each day, either 30 to 60 minutes prior to breakfast 90 tablet 3    olmesartan-hydrochlorothiazide (Benicar HCT) 40-25 mg tablet Take 1 tablet by mouth once daily. 90 tablet 3    potassium chloride CR (Klor-Con) 10 mEq ER tablet Take 1 tablet (10 mEq) by mouth once daily. Do not crush, chew, or split. 30 tablet 0    sertraline (Zoloft) 50 mg tablet Take 1 tablet (50 mg) by mouth once daily. 30 tablet 1    torsemide (Demadex) 20 mg tablet Take 1 tablet (20 mg) by mouth once daily. 30 tablet 0     No current facility-administered medications for this visit.     Family History   Problem Relation Name Age of Onset    No Known Problems Mother      No Known Problems Father       Social History     Socioeconomic History    Marital status:      Spouse name: None    Number of children: None    Years of education: None    Highest education level: None   Occupational History    None   Tobacco Use    Smoking status: Never    Smokeless tobacco: Never   Vaping Use    Vaping status: Never Used   Substance and Sexual Activity    Alcohol use: Never    Drug use: Never    Sexual activity: None   Other Topics Concern    None   Social History Narrative    None     Social Determinants of Health     Financial Resource Strain: Not on file   Food Insecurity: Not on file   Transportation Needs: Not on file   Physical Activity: Not on file   Stress: Not on file   Social Connections: Not on file   Intimate Partner Violence: Not on file   Housing Stability: Not on file     Immunization History   Administered Date(s) Administered    Influenza, High  "Dose Seasonal, Preservative Free 09/30/2015, 10/05/2016    Influenza, Unspecified 10/05/2016    Influenza, seasonal, injectable 09/24/2014    Pneumococcal conjugate vaccine, 13-valent (PREVNAR 13) 09/30/2015    Pneumococcal polysaccharide vaccine, 23-valent, age 2 years and older (PNEUMOVAX 23) 12/20/2014    Td vaccine, age 7 years and older (TENIVAC) 04/04/2016       Review of Systems  Review of systems is otherwise negative unless stated above or in history of present illness.    Objective   Visit Vitals  /90 (BP Location: Right arm, Patient Position: Sitting, BP Cuff Size: Large adult)   Pulse 57   Temp 36.3 °C (97.3 °F)   Ht 1.6 m (5' 3\")   Wt 105 kg (232 lb)   SpO2 90%   BMI 41.10 kg/m²   Smoking Status Never   BSA 2.16 m²     Physical Exam  Constitutional: BMI 41     General: not in acute distress.   HENT:      Head: Normocephalic and atraumatic.      Nose: Nose normal.   Eyes: No jaundice     Extraocular Movements: Extraocular movements intact.      Conjunctiva/sclera: Conjunctivae normal.   Cardiovascular: 3+ ankle edema     Rate and Rhythm: Normal rate ,  No M/R/G  Pulmonary: Crackles     Effort: Pulmonary effort is normal.      Breath sounds: Normal, Bilat Equal AE  Skin: Phlebitis varicose vein lower extremity     General: Skin is warm.   Neurological:      Mental Status: He is alert and oriented to person, place, and time.   Psychiatric:    PTSD without suicide     Mood and Affect: Mood normal.         Behavior: Behavior normal.   Musculoskeletal   FROM in all extremitirs,  Joint-no swelling or tenderness    Lab on 05/14/2024   Component Date Value Ref Range Status    WBC 05/14/2024 8.9  4.4 - 11.3 x10*3/uL Final    nRBC 05/14/2024 0.0  0.0 - 0.0 /100 WBCs Final    RBC 05/14/2024 3.75 (L)  4.00 - 5.20 x10*6/uL Final    Hemoglobin 05/14/2024 11.4 (L)  12.0 - 16.0 g/dL Final    Hematocrit 05/14/2024 37.4  36.0 - 46.0 % Final    MCV 05/14/2024 100  80 - 100 fL Final    MCH 05/14/2024 30.4  26.0 - 34.0 " pg Final    MCHC 05/14/2024 30.5 (L)  32.0 - 36.0 g/dL Final    RDW 05/14/2024 13.1  11.5 - 14.5 % Final    Platelets 05/14/2024 275  150 - 450 x10*3/uL Final    Neutrophils % 05/14/2024 71.4  40.0 - 80.0 % Final    Immature Granulocytes %, Automated 05/14/2024 0.6  0.0 - 0.9 % Final    Lymphocytes % 05/14/2024 18.5  13.0 - 44.0 % Final    Monocytes % 05/14/2024 8.1  2.0 - 10.0 % Final    Eosinophils % 05/14/2024 0.8  0.0 - 6.0 % Final    Basophils % 05/14/2024 0.6  0.0 - 2.0 % Final    Neutrophils Absolute 05/14/2024 6.35 (H)  1.60 - 5.50 x10*3/uL Final    Immature Granulocytes Absolute, Au* 05/14/2024 0.05  0.00 - 0.50 x10*3/uL Final    Lymphocytes Absolute 05/14/2024 1.64  0.80 - 3.00 x10*3/uL Final    Monocytes Absolute 05/14/2024 0.72  0.05 - 0.80 x10*3/uL Final    Eosinophils Absolute 05/14/2024 0.07  0.00 - 0.40 x10*3/uL Final    Basophils Absolute 05/14/2024 0.05  0.00 - 0.10 x10*3/uL Final    Glucose 05/14/2024 98  74 - 99 mg/dL Final    Sodium 05/14/2024 139  136 - 145 mmol/L Final    Potassium 05/14/2024 4.6  3.5 - 5.3 mmol/L Final    Chloride 05/14/2024 103  98 - 107 mmol/L Final    Bicarbonate 05/14/2024 27  21 - 32 mmol/L Final    Anion Gap 05/14/2024 14  10 - 20 mmol/L Final    Urea Nitrogen 05/14/2024 16  6 - 23 mg/dL Final    Creatinine 05/14/2024 0.99  0.50 - 1.05 mg/dL Final    eGFR 05/14/2024 60 (L)  >60 mL/min/1.73m*2 Final    Calcium 05/14/2024 9.6  8.6 - 10.6 mg/dL Final    Albumin 05/14/2024 4.1  3.4 - 5.0 g/dL Final    Alkaline Phosphatase 05/14/2024 62  33 - 136 U/L Final    Total Protein 05/14/2024 6.9  6.4 - 8.2 g/dL Final    AST 05/14/2024 13  9 - 39 U/L Final    Bilirubin, Total 05/14/2024 0.6  0.0 - 1.2 mg/dL Final    ALT 05/14/2024 6 (L)  7 - 45 U/L Final    Hemoglobin A1C 05/14/2024 5.0  see below % Final    Estimated Average Glucose 05/14/2024 97  Not Established mg/dL Final    Cholesterol 05/14/2024 183  0 - 199 mg/dL Final    HDL-Cholesterol 05/14/2024 80.0  mg/dL Final     Cholesterol/HDL Ratio 05/14/2024 2.3   Final    LDL Calculated 05/14/2024 81  <=99 mg/dL Final    VLDL 05/14/2024 22  0 - 40 mg/dL Final    Triglycerides 05/14/2024 111  0 - 149 mg/dL Final    Non HDL Cholesterol 05/14/2024 103  0 - 149 mg/dL Final    Thyroid Stimulating Hormone 05/14/2024 4.58 (H)  0.44 - 3.98 mIU/L Final    Magnesium 05/14/2024 1.99  1.60 - 2.40 mg/dL Final    BNP 05/14/2024 470 (H)  0 - 99 pg/mL Final    Thyroxine, Free 05/14/2024 1.18  0.78 - 1.48 ng/dL Final       Radiology: Reviewed imaging in powerchart.  ECG 12 lead (Clinic Performed)    Result Date: 5/14/2024  Abnormal EKG right bundle branch block associated with the nonspecific ST-T changes in anterolateral leads referred to cardiology for Lexiscan rule out ischemia    XR chest 2 views    Result Date: 5/14/2024  Chest PA and lateral  5/14/2024 9:10 AM CDT Prior Study: 9/26/2023 History:  SHORTNESS OF BREATH Tech notes: WHAT SYMPTOMS ARE YOU EXPERIENCING?; SOB, CHF Findings: There is cardiomegaly similar to the prior study. There is prominence of the central pulmonary vasculature. Since the prior study, the patient has developed mild bilateral interstitial prominence possibly indicating mild pulmonary vascular congestion/interstitial edema. The costophrenic angles are not substantially blunted. There is no definite pneumothorax. There are aortic calcifications. The thoracic aorta is prominent and tortuous; ectasia or aneurysmal dilatation cannot be excluded. The osseous structures are osteopenic. There are degenerative changes of the thoracic spine. Impression: Cardiomegaly similar to the prior study. Interval development of mild bilateral interstitial prominence possibly indicating mild pulmonary vascular congestion/interstitial edema. Electronically signed by:  Dougie Malcolm MD  05/15/2024 12:56 PM EDT Workstation: [id number] Technologist:  DANIEL Dictated By:   DOUGIE MALCOLM MD Signed By:     DOUGIE MALCOLM MD Signed Out:    05/15/24  12:56:11        Charting was completed using voice recognition technology and may include unintended errors.

## 2024-06-06 NOTE — TELEPHONE ENCOUNTER
----- Message from Mari Gooden MD sent at 6/6/2024  3:33 PM EDT -----  Uric acid 9.2 creatinine 1.1 advised allopurinol 300 mg a day #30

## 2024-06-27 ENCOUNTER — PATIENT OUTREACH (OUTPATIENT)
Dept: CARE COORDINATION | Facility: CLINIC | Age: 74
End: 2024-06-27
Payer: COMMERCIAL

## 2024-06-27 RX ORDER — APIXABAN 5 MG (74)
5 KIT ORAL 2 TIMES DAILY
COMMUNITY

## 2024-06-27 NOTE — PROGRESS NOTES
Discharge Facility:[facility]   Discharge Diagnosis:Acute DVT LLE   Admission Date:6/24/2024  Discharge Date: 6/26/2024    PCP Appointment Date:7/12/2024 for 1 mos check, does not qualify for TCM   Specialist Appointment Date:N/A   Hospital Encounter and Summary: Linked   See discharge assessment below for further details  Engagement  Call Start Time: 1513 (6/27/2024  3:28 PM)    Medications  Medications reviewed with patient/caregiver?: Yes (6/27/2024  3:28 PM)  Is the patient having any side effects they believe may be caused by any medication additions or changes?: No (6/27/2024  3:28 PM)  Does the patient have all medications ordered at discharge?: Yes (6/27/2024  3:28 PM)  Care Management Interventions: Provided patient education (Reviewed meds with Rommel, he states had more at pharmacy than was expecting. Pantoprazole was one. He verbalized understanding of eliquis direction.) (6/27/2024  3:28 PM)  Prescription Comments: -- (Eliquis 5 mg starter pack. Take 2 bid x 7 days then one bid) (6/27/2024  3:28 PM)  Is the patient taking all medications as directed (includes completed medication regime)?: Yes (6/27/2024  3:28 PM)    Appointments  Does the patient have a primary care provider?: Yes (6/27/2024  3:28 PM)  Care Management Interventions: Verified appointment date/time/provider (7/12/2024, soonest available) (6/27/2024  3:28 PM)  Has the patient kept scheduled appointments due by today?: Yes (6/27/2024  3:28 PM)    Self Management  What is the home health agency?: -- (N/A) (6/27/2024  3:28 PM)  What Durable Medical Equipment (DME) was ordered?: -- (N/A) (6/27/2024  3:28 PM)    Patient Teaching  Does the patient have access to their discharge instructions?: Yes (6/27/2024  3:28 PM)  What is the patient's perception of their health status since discharge?: Improving (6/27/2024  3:28 PM)  Is the patient/caregiver able to teach back the hierarchy of who to call/visit for symptoms/problems? PCP, Specialist, Home  Health nurse, Urgent Care, ED, 911: Yes (6/27/2024  3:28 PM)    Wrap Up  Wrap Up Additional Comments: -- (Spoke with son Rommel. He states that Anna is doing well since coming home. He assists with her medication. We discussed if any questions or concern to contact provider right away.) (6/27/2024  3:28 PM)

## 2024-07-01 DIAGNOSIS — I50.33 ACUTE ON CHRONIC DIASTOLIC CONGESTIVE HEART FAILURE (MULTI): ICD-10-CM

## 2024-07-01 RX ORDER — POTASSIUM CHLORIDE 750 MG/1
10 TABLET, EXTENDED RELEASE ORAL DAILY
Qty: 30 TABLET | Refills: 0 | OUTPATIENT
Start: 2024-07-01

## 2024-07-01 RX ORDER — TORSEMIDE 20 MG/1
20 TABLET ORAL DAILY
Qty: 30 TABLET | Refills: 0 | OUTPATIENT
Start: 2024-07-01

## 2024-07-03 ENCOUNTER — OFFICE VISIT (OUTPATIENT)
Dept: PRIMARY CARE | Facility: CLINIC | Age: 74
End: 2024-07-03
Payer: COMMERCIAL

## 2024-07-03 VITALS
WEIGHT: 220 LBS | DIASTOLIC BLOOD PRESSURE: 60 MMHG | BODY MASS INDEX: 38.98 KG/M2 | HEART RATE: 71 BPM | SYSTOLIC BLOOD PRESSURE: 85 MMHG | OXYGEN SATURATION: 95 % | HEIGHT: 63 IN

## 2024-07-03 DIAGNOSIS — I82.402 ACUTE DEEP VEIN THROMBOSIS (DVT) OF LEFT LOWER EXTREMITY, UNSPECIFIED VEIN (MULTI): ICD-10-CM

## 2024-07-03 DIAGNOSIS — F41.1 GAD (GENERALIZED ANXIETY DISORDER): ICD-10-CM

## 2024-07-03 DIAGNOSIS — R63.0 POOR APPETITE: ICD-10-CM

## 2024-07-03 DIAGNOSIS — L50.9 HIVES: Primary | ICD-10-CM

## 2024-07-03 PROCEDURE — 3074F SYST BP LT 130 MM HG: CPT | Performed by: FAMILY MEDICINE

## 2024-07-03 PROCEDURE — 1036F TOBACCO NON-USER: CPT | Performed by: FAMILY MEDICINE

## 2024-07-03 PROCEDURE — 1159F MED LIST DOCD IN RCRD: CPT | Performed by: FAMILY MEDICINE

## 2024-07-03 PROCEDURE — 99214 OFFICE O/P EST MOD 30 MIN: CPT | Performed by: FAMILY MEDICINE

## 2024-07-03 PROCEDURE — 3078F DIAST BP <80 MM HG: CPT | Performed by: FAMILY MEDICINE

## 2024-07-03 RX ORDER — PREDNISONE 20 MG/1
40 TABLET ORAL DAILY
Qty: 10 TABLET | Refills: 0 | Status: SHIPPED | OUTPATIENT
Start: 2024-07-03 | End: 2024-07-08

## 2024-07-03 RX ORDER — MIRTAZAPINE 7.5 MG/1
7.5 TABLET, FILM COATED ORAL NIGHTLY
Qty: 30 TABLET | Refills: 0 | Status: SHIPPED | OUTPATIENT
Start: 2024-07-03 | End: 2024-10-01

## 2024-07-03 RX ORDER — DIPHENHYDRAMINE HCL 25 MG
25 TABLET ORAL 2 TIMES DAILY PRN
Qty: 30 TABLET | Refills: 0 | Status: SHIPPED | OUTPATIENT
Start: 2024-07-03 | End: 2024-08-02

## 2024-07-03 RX ORDER — FAMOTIDINE 20 MG/1
20 TABLET, FILM COATED ORAL 2 TIMES DAILY
Qty: 20 TABLET | Refills: 0 | Status: SHIPPED | OUTPATIENT
Start: 2024-07-03 | End: 2024-12-30

## 2024-07-03 NOTE — PROGRESS NOTES
Subjective   Patient ID: Anna Freedman [id number] is a 73 y.o. female who presents for Rash (Rash on back and legs).    HPI   Patient is here for concern for rash on chest back and legs for last 2 to 3 days.  Patient was recently admitted to hospital 2024 for acute renal failure and left leg DVT.  Patient had a CT chest with mild cardiomegaly was started on metoprolol 25 and Eliquis 5 twice daily for DVT.  And discharged home.  Patient noticing increasing rash and itching all over the body for last 3 days.  Patient denies any chest pain or shortness of breath no headache or dizziness.    Patient noticing poor appetite and difficulty eating.   recently  because of MI.  Patient noticed to have difficulty sleeping and difficulty eating , on Xanax for Anxiety    Social History     Tobacco Use    Smoking status: Never    Smokeless tobacco: Never   Vaping Use    Vaping status: Never Used   Substance Use Topics    Alcohol use: Never    Drug use: Never       No Known Allergies    Current Outpatient Medications   Medication Sig Dispense Refill    allopurinol (Zyloprim) 300 mg tablet Take 1 tablet (300 mg) by mouth once daily. 90 tablet 1    apixaban (Eliquis DVT-PE Treat 30D Start) 5 mg (74 tabs) tablet Take 1 tablet (5 mg) by mouth 2 times a day. Take 2 tablets (10 mg) by mouth 2 times a day for 7 days, then take 1 tablet (5 mg) by mouth 2 times a day.      atorvastatin (Lipitor) 10 mg tablet Take 1 tablet (10 mg) by mouth once daily. 90 tablet 3    levothyroxine (Synthroid) 25 mcg tablet Take 1 tablet (25 mcg) by mouth early in the morning.. Take on an empty stomach at the same time each day, either 30 to 60 minutes prior to breakfast 90 tablet 3    olmesartan-hydrochlorothiazide (Benicar HCT) 40-25 mg tablet Take 1 tablet by mouth once daily. 90 tablet 3    potassium chloride CR (Klor-Con) 10 mEq ER tablet Take 1 tablet (10 mEq) by mouth once daily. Do not crush, chew, or split. 30 tablet 0    sertraline  "(Zoloft) 50 mg tablet Take 1 tablet (50 mg) by mouth once daily. 30 tablet 1    torsemide (Demadex) 20 mg tablet Take 1 tablet (20 mg) by mouth once daily. 30 tablet 0     No current facility-administered medications for this visit.       Physical Exam  BP 85/60   Pulse 71   Ht 1.6 m (5' 3\")   Wt 99.8 kg (220 lb)   SpO2 95%   BMI 38.97 kg/m²     General Appearance:  Alert, cooperative, no distress,   Head:  Normocephalic, atraumatic   Eyes:  PERRL, conjunctiva/corneas clear, EOM's intact,    Lungs:   Clear to auscultation bilaterally, respirations unlabored   Heart:  Regular rate and rhythm, S1 and S2 normal, no murmur,    Neurologic: Normal                                   SKIN : Erythematous blotchy rash all over chest back and  upper extremities.  Orders Only on 06/06/2024   Component Date Value Ref Range Status    [facility] Magnesium 06/06/2024 1.7  1.6 - 2.6 mg/dL Final    [facility] Uric Acid 06/06/2024 9.2 (H)  3.1 - 7.8 mg/dL Final    Comment: Venipuncture should occur prior to N-Acetyl Cysteine (NAC) or Metamizole (Sulpyrine)  administration due to the potential for falsely depressed results.      [facility] Glucose 06/06/2024 89  74 - 106 mg/dL Final    [facility] CO2, venous 06/06/2024 33.0 (H)  20.0 - 31.0 mmol/L Final    [facility] BUN/Creat Ratio 06/06/2024 20.9   Final    [facility] GFR AA 06/06/2024 58   Final    Comment:  GFR CalcMedical judgement is necessary to interpret GFR.  The calculated GFR may not accurately reflect renal status in patients >70 years, pregnant women, acutely ill hospitalized patients and patients with acute renal failure or known renal disease.  The MDRD GFR formula is valid only for adults greater than 18 years of age.  Note:  Creatinine clearance (not GFR) should be used for drug dosing.      [facility] Chloride 06/06/2024 102  98 - 107 mmol/L Final    [facility] Calcium 06/06/2024 9.8  8.7 - 10.4 mg/dL Final    [facility] Glomerular Filtration R* " 06/06/2024 48  mL/min/1.73m? Final    Comment: Non- GFR CalcMedical judgement is necessary to interpret GFR.  The calculated GFR may not accurately reflect renal status in patients >70 years, pregnant women, acutely ill hospitalized patients and patients with acute renal failure or known renal disease.  The MDRD GFR formula is valid only for adults greater than 18 years of age.  Note:  Creatinine clearance (not GFR) should be used for drug dosing.      [facility] K 06/06/2024 4.9  3.5 - 5.1 mmol/L Final    [facility] Na 06/06/2024 141  135 - 145 mmol/L Final    [facility] BUN 06/06/2024 23  9 - 23 mg/dL Final    Comment: -  Venipuncture should occur prior to N-Acetyl Cysteine (NAC) or Metamizole (Sulpyrine) administration due to the potential for falsely depressed results.  -  Blood samples from some patients with monoclonal gammopathies may produce falsely elevated results      [facility] Creatinine 06/06/2024 1.1 (H)  0.5 - 0.8 mg/dL Final    [facility] Calculated Osmolality 06/06/2024 285  275 - 295 mOsm/kg Final   Lab on 05/14/2024   Component Date Value Ref Range Status    WBC 05/14/2024 8.9  4.4 - 11.3 x10*3/uL Final    nRBC 05/14/2024 0.0  0.0 - 0.0 /100 WBCs Final    RBC 05/14/2024 3.75 (L)  4.00 - 5.20 x10*6/uL Final    Hemoglobin 05/14/2024 11.4 (L)  12.0 - 16.0 g/dL Final    Hematocrit 05/14/2024 37.4  36.0 - 46.0 % Final    MCV 05/14/2024 100  80 - 100 fL Final    MCH 05/14/2024 30.4  26.0 - 34.0 pg Final    MCHC 05/14/2024 30.5 (L)  32.0 - 36.0 g/dL Final    RDW 05/14/2024 13.1  11.5 - 14.5 % Final    Platelets 05/14/2024 275  150 - 450 x10*3/uL Final    Neutrophils % 05/14/2024 71.4  40.0 - 80.0 % Final    Immature Granulocytes %, Automated 05/14/2024 0.6  0.0 - 0.9 % Final    Immature Granulocyte Count (IG) includes promyelocytes, myelocytes and metamyelocytes but does not include bands. Percent differential counts (%) should be interpreted in the context of the absolute cell counts  (cells/UL).    Lymphocytes % 05/14/2024 18.5  13.0 - 44.0 % Final    Monocytes % 05/14/2024 8.1  2.0 - 10.0 % Final    Eosinophils % 05/14/2024 0.8  0.0 - 6.0 % Final    Basophils % 05/14/2024 0.6  0.0 - 2.0 % Final    Neutrophils Absolute 05/14/2024 6.35 (H)  1.60 - 5.50 x10*3/uL Final    Percent differential counts (%) should be interpreted in the context of the absolute cell counts (cells/uL).    Immature Granulocytes Absolute, Au* 05/14/2024 0.05  0.00 - 0.50 x10*3/uL Final    Lymphocytes Absolute 05/14/2024 1.64  0.80 - 3.00 x10*3/uL Final    Monocytes Absolute 05/14/2024 0.72  0.05 - 0.80 x10*3/uL Final    Eosinophils Absolute 05/14/2024 0.07  0.00 - 0.40 x10*3/uL Final    Basophils Absolute 05/14/2024 0.05  0.00 - 0.10 x10*3/uL Final    Glucose 05/14/2024 98  74 - 99 mg/dL Final    Sodium 05/14/2024 139  136 - 145 mmol/L Final    Potassium 05/14/2024 4.6  3.5 - 5.3 mmol/L Final    Chloride 05/14/2024 103  98 - 107 mmol/L Final    Bicarbonate 05/14/2024 27  21 - 32 mmol/L Final    Anion Gap 05/14/2024 14  10 - 20 mmol/L Final    Urea Nitrogen 05/14/2024 16  6 - 23 mg/dL Final    Creatinine 05/14/2024 0.99  0.50 - 1.05 mg/dL Final    eGFR 05/14/2024 60 (L)  >60 mL/min/1.73m*2 Final    Calculations of estimated GFR are performed using the 2021 CKD-EPI Study Refit equation without the race variable for the IDMS-Traceable creatinine methods.  https://jasn.asnjournals.org/content/early/2021/09/22/ASN.1271480676    Calcium 05/14/2024 9.6  8.6 - 10.6 mg/dL Final    Albumin 05/14/2024 4.1  3.4 - 5.0 g/dL Final    Alkaline Phosphatase 05/14/2024 62  33 - 136 U/L Final    Total Protein 05/14/2024 6.9  6.4 - 8.2 g/dL Final    AST 05/14/2024 13  9 - 39 U/L Final    Bilirubin, Total 05/14/2024 0.6  0.0 - 1.2 mg/dL Final    ALT 05/14/2024 6 (L)  7 - 45 U/L Final    Patients treated with Sulfasalazine may generate falsely decreased results for ALT.    Hemoglobin A1C 05/14/2024 5.0  see below % Final    Estimated Average  Glucose 05/14/2024 97  Not Established mg/dL Final    Cholesterol 05/14/2024 183  0 - 199 mg/dL Final          Age      Desirable   Borderline High   High     0-19 Y     0 - 169       170 - 199     >/= 200    20-24 Y     0 - 189       190 - 224     >/= 225         >24 Y     0 - 199       200 - 239     >/= 240   **All ranges are based on fasting samples. Specific   therapeutic targets will vary based on patient-specific   cardiac risk.    Pediatric guidelines reference:Pediatrics 2011, 128(S5).Adult guidelines reference: NCEP ATPIII Guidelines,[place] 2001, 258:2486-97    Venipuncture immediately after or during the administration of Metamizole may lead to falsely low results. Testing should be performed immediately prior to Metamizole dosing.    HDL-Cholesterol 05/14/2024 80.0  mg/dL Final      Age       Very Low   Low     Normal    High    0-19 Y    < 35      < 40     40-45     ----  20-24 Y    ----     < 40      >45      ----        >24 Y      ----     < 40     40-60      >60      Cholesterol/HDL Ratio 05/14/2024 2.3   Final      Ref Values  Desirable  < 3.4  High Risk  > 5.0    LDL Calculated 05/14/2024 81  <=99 mg/dL Final                                Near   Borderline      AGE      Desirable  Optimal    High     High     Very High     0-19 Y     0 - 109     ---    110-129   >/= 130     ----    20-24 Y     0 - 119     ---    120-159   >/= 160     ----      >24 Y     0 -  99   100-129  130-159   160-189     >/=190      VLDL 05/14/2024 22  0 - 40 mg/dL Final    Triglycerides 05/14/2024 111  0 - 149 mg/dL Final       Age         Desirable   Borderline High   High     Very High   0 D-90 D    19 - 174         ----         ----        ----  91 D- 9 Y     0 -  74        75 -  99     >/= 100      ----    10-19 Y     0 -  89        90 - 129     >/= 130      ----    20-24 Y     0 - 114       115 - 149     >/= 150      ----         >24 Y     0 - 149       150 - 199    200- 499    >/= 500    Venipuncture immediately after or  during the administration of Metamizole may lead to falsely low results. Testing should be performed immediately prior to Metamizole dosing.    Non HDL Cholesterol 05/14/2024 103  0 - 149 mg/dL Final          Age       Desirable   Borderline High   High     Very High     0-19 Y     0 - 119       120 - 144     >/= 145    >/= 160    20-24 Y     0 - 149       150 - 189     >/= 190      ----         >24 Y    30 mg/dL above LDL Cholesterol goal      Thyroid Stimulating Hormone 05/14/2024 4.58 (H)  0.44 - 3.98 mIU/L Final    Magnesium 05/14/2024 1.99  1.60 - 2.40 mg/dL Final    BNP 05/14/2024 470 (H)  0 - 99 pg/mL Final    Thyroxine, Free 05/14/2024 1.18  0.78 - 1.48 ng/dL Final       Assessment/Plan   Diagnoses and all orders for this visit:  Hives  -     predniSONE (Deltasone) 20 mg tablet; Take 2 tablets (40 mg) by mouth once daily for 5 days.  -     famotidine (Pepcid) 20 mg tablet; Take 1 tablet (20 mg) by mouth 2 times a day.  -     diphenhydrAMINE (Sominex) 25 mg tablet; Take 1 tablet (25 mg) by mouth 2 times a day as needed for itching or allergies.  Acute deep vein thrombosis (DVT) of left lower extremity, unspecified vein (Multi)  -     rivaroxaban (Xarelto) 15 mg tablet; Take 1 tablet (15 mg) by mouth 2 times a day for 21 days. Take with food.  Poor appetite  -     mirtazapine (Remeron) 7.5 mg tablet; Take 1 tablet (7.5 mg) by mouth once daily at bedtime.  JONES (generalized anxiety disorder)  -     mirtazapine (Remeron) 7.5 mg tablet; Take 1 tablet (7.5 mg) by mouth once daily at bedtime.    Concern for hives discussed with the patient  Will hold off Eliquis  Patient was advised to start Xarelto 15 mg twice daily samples given  Hold metoprolol for now    Start prednisone with Pepcid  Can take Benadryl as needed for itching or allergies.    Chronic anxiety with poor appetite and insomnia  Add Remeron 7.5  at bedtime    Follow-up PCP Dr. Gooden 7/9/2024 for hospital discharge follow up.

## 2024-07-09 ENCOUNTER — APPOINTMENT (OUTPATIENT)
Dept: PRIMARY CARE | Facility: CLINIC | Age: 74
End: 2024-07-09
Payer: COMMERCIAL

## 2024-07-09 VITALS
WEIGHT: 225.4 LBS | DIASTOLIC BLOOD PRESSURE: 57 MMHG | HEIGHT: 63 IN | SYSTOLIC BLOOD PRESSURE: 96 MMHG | HEART RATE: 60 BPM | OXYGEN SATURATION: 98 % | BODY MASS INDEX: 39.94 KG/M2

## 2024-07-09 DIAGNOSIS — I82.402 ACUTE DEEP VEIN THROMBOSIS (DVT) OF LEFT LOWER EXTREMITY, UNSPECIFIED VEIN (MULTI): ICD-10-CM

## 2024-07-09 DIAGNOSIS — R21 SKIN RASH: Primary | ICD-10-CM

## 2024-07-09 DIAGNOSIS — E78.2 HYPERLIPEMIA, MIXED: ICD-10-CM

## 2024-07-09 DIAGNOSIS — I10 BENIGN ESSENTIAL HYPERTENSION: ICD-10-CM

## 2024-07-09 DIAGNOSIS — E66.01 CLASS 3 SEVERE OBESITY DUE TO EXCESS CALORIES WITH SERIOUS COMORBIDITY IN ADULT, UNSPECIFIED BMI (MULTI): ICD-10-CM

## 2024-07-09 DIAGNOSIS — T50.4X5A: ICD-10-CM

## 2024-07-09 PROBLEM — R79.89 HIGH SERUM THYROID STIMULATING HORMONE (TSH): Status: RESOLVED | Noted: 2024-06-04 | Resolved: 2024-07-09

## 2024-07-09 PROBLEM — F32.1 CURRENT MODERATE EPISODE OF MAJOR DEPRESSIVE DISORDER (MULTI): Status: RESOLVED | Noted: 2024-06-06 | Resolved: 2024-07-09

## 2024-07-09 PROBLEM — I80.9 PHLEBITIS: Status: RESOLVED | Noted: 2024-06-06 | Resolved: 2024-07-09

## 2024-07-09 PROBLEM — I26.99 ACUTE PULMONARY EMBOLISM (MULTI): Status: ACTIVE | Noted: 2024-07-09

## 2024-07-09 PROBLEM — M25.561 ACUTE PAIN OF RIGHT KNEE: Status: RESOLVED | Noted: 2023-07-18 | Resolved: 2024-07-09

## 2024-07-09 PROBLEM — I50.33 ACUTE ON CHRONIC DIASTOLIC CONGESTIVE HEART FAILURE (MULTI): Status: RESOLVED | Noted: 2024-06-04 | Resolved: 2024-07-09

## 2024-07-09 PROCEDURE — 3074F SYST BP LT 130 MM HG: CPT | Performed by: INTERNAL MEDICINE

## 2024-07-09 PROCEDURE — 99214 OFFICE O/P EST MOD 30 MIN: CPT | Performed by: INTERNAL MEDICINE

## 2024-07-09 PROCEDURE — 3078F DIAST BP <80 MM HG: CPT | Performed by: INTERNAL MEDICINE

## 2024-07-09 PROCEDURE — 96372 THER/PROPH/DIAG INJ SC/IM: CPT | Performed by: INTERNAL MEDICINE

## 2024-07-09 RX ORDER — HYDROXYZINE HYDROCHLORIDE 25 MG/1
25 TABLET, FILM COATED ORAL 2 TIMES DAILY
Qty: 20 TABLET | Refills: 0 | Status: SHIPPED | OUTPATIENT
Start: 2024-07-09

## 2024-07-09 RX ORDER — TRIAMCINOLONE ACETONIDE 40 MG/ML
40 INJECTION, SUSPENSION INTRA-ARTICULAR; INTRAMUSCULAR ONCE
Status: COMPLETED | OUTPATIENT
Start: 2024-07-09 | End: 2024-07-09

## 2024-07-09 RX ORDER — METOPROLOL TARTRATE 50 MG/1
25 TABLET ORAL
COMMUNITY
Start: 2024-06-26

## 2024-07-09 RX ORDER — PREDNISONE 5 MG/1
5 TABLET ORAL 2 TIMES DAILY
Qty: 20 TABLET | Refills: 0 | Status: SHIPPED | OUTPATIENT
Start: 2024-07-09

## 2024-07-09 RX ORDER — PANTOPRAZOLE SODIUM 40 MG/1
1 TABLET, DELAYED RELEASE ORAL
COMMUNITY
Start: 2024-06-26

## 2024-07-09 NOTE — PROGRESS NOTES
Subjective   Patient ID: Anna Freedman is a 73 y.o. female who presents for Follow-up, Itching (All over ), and Mouth Lesions.    Assessment/Plan     Problem List Items Addressed This Visit       Benign essential hypertension    Hyperlipemia, mixed    Class 3 severe obesity due to excess calories with serious comorbidity in adult (Multi)    Skin rash - Primary    Allopurinol adverse reaction    Acute deep vein thrombosis (DVT) of left lower extremity (Multi)       HPI this is a 73-year-old patient admitted to [facility] in June 24 discharged on June 26 diagnosis of acute deep vein thrombosis  osteoarthritis gouty arthritis hypertension hyperlipidemia cor pulmonale discharge home start taking the allopurinol developed maculopapular nonvesicular skin lesion all over the body associated with the rash and ulceration in the mouth advise discontinue allopurinol discontinue amlodipine discontinue Benadryl discontinue Pepcid discontinue Remeron discontinue Zoloft and Demadex given patient Kenalog injection 40 mg intramuscular for skin rash given Atarax 25 twice a day prednisone 5 twice a day    Review and discussed case with the nephrology pulmonary service    Negative for hypoxia    Negative for fall    Negative for chest pain    Negative for bleeding    Continue insomnia because the pruritus poor appetite because of the side effect of allopurinol with the mucositis in the mouth blood pressure running low side advise stop the blood pressure medication  No past medical history on file.  Past Surgical History:   Procedure Laterality Date    TOTAL KNEE ARTHROPLASTY  03/22/2016    Knee Replacement     No Known Allergies  Current Outpatient Medications   Medication Sig Dispense Refill    atorvastatin (Lipitor) 10 mg tablet Take 1 tablet (10 mg) by mouth once daily. 90 tablet 3    levothyroxine (Synthroid) 25 mcg tablet Take 1 tablet (25 mcg) by mouth early in the morning.. Take on an empty stomach at the same time each  day, either 30 to 60 minutes prior to breakfast 90 tablet 3    Lopressor 50 mg tablet 0.5 tablets (25 mg).      olmesartan-hydrochlorothiazide (Benicar HCT) 40-25 mg tablet Take 1 tablet by mouth once daily. 90 tablet 3    pantoprazole (ProtoNix) 40 mg EC tablet Take 1 tablet (40 mg) by mouth early in the morning..      rivaroxaban (Xarelto) 15 mg tablet Take 1 tablet (15 mg) by mouth 2 times a day for 21 days. Take with food. 42 tablet 0     No current facility-administered medications for this visit.     Family History   Problem Relation Name Age of Onset    No Known Problems Mother      No Known Problems Father       Social History     Socioeconomic History    Marital status:      Spouse name: Not on file    Number of children: Not on file    Years of education: Not on file    Highest education level: Not on file   Occupational History    Not on file   Tobacco Use    Smoking status: Never    Smokeless tobacco: Never   Vaping Use    Vaping status: Never Used   Substance and Sexual Activity    Alcohol use: Never    Drug use: Never    Sexual activity: Not on file   Other Topics Concern    Not on file   Social History Narrative    Not on file     Social Determinants of Health     Financial Resource Strain: Not on file   Food Insecurity: Not on file   Transportation Needs: Not on file   Physical Activity: Not on file   Stress: Not on file   Social Connections: Not on file   Intimate Partner Violence: Not on file   Housing Stability: Not on file     Immunization History   Administered Date(s) Administered    Influenza, High Dose Seasonal, Preservative Free 09/30/2015, 10/05/2016    Influenza, Unspecified 10/05/2016    Influenza, seasonal, injectable 09/24/2014    Pneumococcal conjugate vaccine, 13-valent (PREVNAR 13) 09/30/2015    Pneumococcal polysaccharide vaccine, 23-valent, age 2 years and older (PNEUMOVAX 23) 12/20/2014    Td vaccine, age 7 years and older (TENIVAC) 04/04/2016       Review of  "Systems  Review of systems is otherwise negative unless stated above or in history of present illness.    Objective   Visit Vitals  BP 96/57   Pulse 60   Ht 1.6 m (5' 3\")   Wt 102 kg (225 lb 6.4 oz)   SpO2 98%   BMI 39.93 kg/m²   Smoking Status Never   BSA 2.13 m²     Physical Exam  Constitutional: BMI 39     General: not in acute distress.   HENT: Mouth multiple ulcer     Head: Normocephalic and atraumatic.      Nose: Nose normal.   Eyes: No jaundice     Extraocular Movements: Extraocular movements intact.      Conjunctiva/sclera: Conjunctivae normal.   Cardiovascular: Heart murmur     Rate and Rhythm: Normal rate ,  No M/R/G  Pulmonary: Crackle     Effort: Pulmonary effort is normal.      Breath sounds: Normal, Bilat Equal AE  Skin: Skin rash     General: Skin is warm.   Neurological: Neuralgia     Mental Status: He is alert and oriented to person, place, and time.   Psychiatric:    Anxiety     Mood and Affect: Mood normal.         Behavior: Behavior normal.   Musculoskeletal arthritis of knee  FROM in all extremitirs,  Joint-no swelling or tenderness    Orders Only on 06/06/2024   Component Date Value Ref Range Status    [facility] Magnesium 06/06/2024 1.7  1.6 - 2.6 mg/dL Final    [facility] Uric Acid 06/06/2024 9.2 (H)  3.1 - 7.8 mg/dL Final    [facility] Glucose 06/06/2024 89  74 - 106 mg/dL Final    [facility] CO2, venous 06/06/2024 33.0 (H)  20.0 - 31.0 mmol/L Final    [facility] BUN/Creat Ratio 06/06/2024 20.9   Final    [facility] GFR AA 06/06/2024 58   Final    [facility] Chloride 06/06/2024 102  98 - 107 mmol/L Final    [facility] Calcium 06/06/2024 9.8  8.7 - 10.4 mg/dL Final    [facility] Glomerular Filtration R* 06/06/2024 48  mL/min/1.73m? Final    [facility] K 06/06/2024 4.9  3.5 - 5.1 mmol/L Final    [facility] Na 06/06/2024 141  135 - 145 mmol/L Final    [facility] BUN 06/06/2024 23  9 - 23 mg/dL Final    [facility] Creatinine 06/06/2024 1.1 (H)  0.5 - 0.8 mg/dL Final    [facility] Calculated " Osmolality 06/06/2024 285  275 - 295 mOsm/kg Final   Lab on 05/14/2024   Component Date Value Ref Range Status    WBC 05/14/2024 8.9  4.4 - 11.3 x10*3/uL Final    nRBC 05/14/2024 0.0  0.0 - 0.0 /100 WBCs Final    RBC 05/14/2024 3.75 (L)  4.00 - 5.20 x10*6/uL Final    Hemoglobin 05/14/2024 11.4 (L)  12.0 - 16.0 g/dL Final    Hematocrit 05/14/2024 37.4  36.0 - 46.0 % Final    MCV 05/14/2024 100  80 - 100 fL Final    MCH 05/14/2024 30.4  26.0 - 34.0 pg Final    MCHC 05/14/2024 30.5 (L)  32.0 - 36.0 g/dL Final    RDW 05/14/2024 13.1  11.5 - 14.5 % Final    Platelets 05/14/2024 275  150 - 450 x10*3/uL Final    Neutrophils % 05/14/2024 71.4  40.0 - 80.0 % Final    Immature Granulocytes %, Automated 05/14/2024 0.6  0.0 - 0.9 % Final    Lymphocytes % 05/14/2024 18.5  13.0 - 44.0 % Final    Monocytes % 05/14/2024 8.1  2.0 - 10.0 % Final    Eosinophils % 05/14/2024 0.8  0.0 - 6.0 % Final    Basophils % 05/14/2024 0.6  0.0 - 2.0 % Final    Neutrophils Absolute 05/14/2024 6.35 (H)  1.60 - 5.50 x10*3/uL Final    Immature Granulocytes Absolute, Au* 05/14/2024 0.05  0.00 - 0.50 x10*3/uL Final    Lymphocytes Absolute 05/14/2024 1.64  0.80 - 3.00 x10*3/uL Final    Monocytes Absolute 05/14/2024 0.72  0.05 - 0.80 x10*3/uL Final    Eosinophils Absolute 05/14/2024 0.07  0.00 - 0.40 x10*3/uL Final    Basophils Absolute 05/14/2024 0.05  0.00 - 0.10 x10*3/uL Final    Glucose 05/14/2024 98  74 - 99 mg/dL Final    Sodium 05/14/2024 139  136 - 145 mmol/L Final    Potassium 05/14/2024 4.6  3.5 - 5.3 mmol/L Final    Chloride 05/14/2024 103  98 - 107 mmol/L Final    Bicarbonate 05/14/2024 27  21 - 32 mmol/L Final    Anion Gap 05/14/2024 14  10 - 20 mmol/L Final    Urea Nitrogen 05/14/2024 16  6 - 23 mg/dL Final    Creatinine 05/14/2024 0.99  0.50 - 1.05 mg/dL Final    eGFR 05/14/2024 60 (L)  >60 mL/min/1.73m*2 Final    Calcium 05/14/2024 9.6  8.6 - 10.6 mg/dL Final    Albumin 05/14/2024 4.1  3.4 - 5.0 g/dL Final    Alkaline Phosphatase  05/14/2024 62  33 - 136 U/L Final    Total Protein 05/14/2024 6.9  6.4 - 8.2 g/dL Final    AST 05/14/2024 13  9 - 39 U/L Final    Bilirubin, Total 05/14/2024 0.6  0.0 - 1.2 mg/dL Final    ALT 05/14/2024 6 (L)  7 - 45 U/L Final    Hemoglobin A1C 05/14/2024 5.0  see below % Final    Estimated Average Glucose 05/14/2024 97  Not Established mg/dL Final    Cholesterol 05/14/2024 183  0 - 199 mg/dL Final    HDL-Cholesterol 05/14/2024 80.0  mg/dL Final    Cholesterol/HDL Ratio 05/14/2024 2.3   Final    LDL Calculated 05/14/2024 81  <=99 mg/dL Final    VLDL 05/14/2024 22  0 - 40 mg/dL Final    Triglycerides 05/14/2024 111  0 - 149 mg/dL Final    Non HDL Cholesterol 05/14/2024 103  0 - 149 mg/dL Final    Thyroid Stimulating Hormone 05/14/2024 4.58 (H)  0.44 - 3.98 mIU/L Final    Magnesium 05/14/2024 1.99  1.60 - 2.40 mg/dL Final    BNP 05/14/2024 470 (H)  0 - 99 pg/mL Final    Thyroxine, Free 05/14/2024 1.18  0.78 - 1.48 ng/dL Final       Radiology: Reviewed imaging in powerchart.  XR chest 1 view    Result Date: 6/23/2024  * * *Final Report* * * DATE OF EXAM: Jun 23 2024  6:57PM   BRX   [id number]  -  XR CHEST 1V FRONTAL PORT  / ACCESSION #  [id number] PROCEDURE REASON: Shortness of breath      * * * * Physician Interpretation * * * *  EXAMINATION:  CHEST RADIOGRAPH (PORTABLE SINGLE VIEW AP) Exam Date/Time:  6/23/2024 6:57 PM CLINICAL HISTORY: Shortness of breath MQ:  XCPR_5 Comparison:  8/23/2011 RESULT: Lines, tubes, and devices:  None. Lungs and pleura:  No acute infiltrates are identified.  The LEFT lung difficult to evaluate due to the size of the heart Cardiomediastinal silhouette:  Stable cardiomediastinal silhouette. Other:  Bony structures unremarkable.    IMPRESSION: Findings as discussed under Results portion of report. : GAYLA   Transcribe Date/Time: Jun 23 2024  7:05P Dictated by : KRYS WALTON DO This examination was interpreted and the report reviewed and electronically signed by: KRYS WALTON DO  on Jun 23 2024  7:07PM  EST        Charting was completed using voice recognition technology and may include unintended errors.

## 2024-07-12 ENCOUNTER — APPOINTMENT (OUTPATIENT)
Dept: PRIMARY CARE | Facility: CLINIC | Age: 74
End: 2024-07-12
Payer: COMMERCIAL

## 2024-07-16 ENCOUNTER — PATIENT OUTREACH (OUTPATIENT)
Dept: CARE COORDINATION | Facility: CLINIC | Age: 74
End: 2024-07-16

## 2024-07-16 ENCOUNTER — APPOINTMENT (OUTPATIENT)
Dept: PRIMARY CARE | Facility: CLINIC | Age: 74
End: 2024-07-16
Payer: COMMERCIAL

## 2024-07-16 ENCOUNTER — TELEPHONE (OUTPATIENT)
Dept: PRIMARY CARE | Facility: CLINIC | Age: 74
End: 2024-07-16

## 2024-07-16 VITALS
DIASTOLIC BLOOD PRESSURE: 85 MMHG | OXYGEN SATURATION: 95 % | HEIGHT: 60 IN | WEIGHT: 224 LBS | SYSTOLIC BLOOD PRESSURE: 134 MMHG | BODY MASS INDEX: 43.98 KG/M2 | HEART RATE: 56 BPM

## 2024-07-16 DIAGNOSIS — R21 SKIN RASH: ICD-10-CM

## 2024-07-16 DIAGNOSIS — I10 BENIGN ESSENTIAL HYPERTENSION: ICD-10-CM

## 2024-07-16 DIAGNOSIS — I82.402 ACUTE DEEP VEIN THROMBOSIS (DVT) OF LEFT LOWER EXTREMITY, UNSPECIFIED VEIN (MULTI): ICD-10-CM

## 2024-07-16 DIAGNOSIS — E66.01 CLASS 3 SEVERE OBESITY DUE TO EXCESS CALORIES WITH SERIOUS COMORBIDITY IN ADULT, UNSPECIFIED BMI (MULTI): ICD-10-CM

## 2024-07-16 DIAGNOSIS — E78.2 HYPERLIPEMIA, MIXED: ICD-10-CM

## 2024-07-16 DIAGNOSIS — R79.89 HIGH SERUM THYROID STIMULATING HORMONE (TSH): ICD-10-CM

## 2024-07-16 DIAGNOSIS — M79.673 PAIN OF FOOT, UNSPECIFIED LATERALITY: Primary | ICD-10-CM

## 2024-07-16 PROBLEM — T50.4X5A: Status: RESOLVED | Noted: 2024-07-09 | Resolved: 2024-07-16

## 2024-07-16 PROBLEM — I26.99 ACUTE PULMONARY EMBOLISM (MULTI): Status: RESOLVED | Noted: 2024-07-09 | Resolved: 2024-07-16

## 2024-07-16 PROCEDURE — 3079F DIAST BP 80-89 MM HG: CPT | Performed by: INTERNAL MEDICINE

## 2024-07-16 PROCEDURE — 1160F RVW MEDS BY RX/DR IN RCRD: CPT | Performed by: INTERNAL MEDICINE

## 2024-07-16 PROCEDURE — 1159F MED LIST DOCD IN RCRD: CPT | Performed by: INTERNAL MEDICINE

## 2024-07-16 PROCEDURE — 3075F SYST BP GE 130 - 139MM HG: CPT | Performed by: INTERNAL MEDICINE

## 2024-07-16 PROCEDURE — 1036F TOBACCO NON-USER: CPT | Performed by: INTERNAL MEDICINE

## 2024-07-16 PROCEDURE — 99214 OFFICE O/P EST MOD 30 MIN: CPT | Performed by: INTERNAL MEDICINE

## 2024-07-16 RX ORDER — LIDOCAINE 50 MG/G
OINTMENT TOPICAL 2 TIMES DAILY PRN
Qty: 120 G | Refills: 0 | Status: SHIPPED | OUTPATIENT
Start: 2024-07-16 | End: 2025-07-16

## 2024-07-16 RX ORDER — PHENTERMINE HYDROCHLORIDE 37.5 MG/1
37.5 TABLET ORAL
Qty: 30 TABLET | Refills: 3 | Status: SHIPPED | OUTPATIENT
Start: 2024-07-16

## 2024-07-16 RX ORDER — LEVOTHYROXINE SODIUM 25 UG/1
25 TABLET ORAL DAILY
Qty: 90 TABLET | Refills: 3 | Status: SHIPPED | OUTPATIENT
Start: 2024-07-16

## 2024-07-16 NOTE — PROGRESS NOTES
Call regarding appt. with PCP on 7/16/2024 after hospitalization.  At time of outreach call the patient feels as if their condition has improved since last visit.  Reviewed the PCP appointment with the pt and addressed any questions or concerns.  Spoke with Rommel who states Anna is doing well.

## 2024-07-16 NOTE — PROGRESS NOTES
Subjective   Patient ID: Anna Freedman is a 73 y.o. female who presents for Injections.    Assessment/Plan     Problem List Items Addressed This Visit       Benign essential hypertension     Patients BP readings reviewed and addressed, as we age our arteries turn stiffer and less elastic. Restricting salt consumption and staying physically fit with regular exercise regimen is the only way to keep our vasculature less tonic. Studies have shown that keeping ideal body wt, exercise routine about 140 to 150 minutes a week, eating variety of plant based diet and drinking plentiful water are quite helpful. Monitor BP twice or once a week at home and bring log to be reviewed by me. Uncontrolled BP has long term consequences including heart failure, myocardial infarction, accelerated atherosclerosis and kidney dysfunction. Therapy reviewed and explained.           Hyperlipemia, mixed     Monitor CMP CPK lipid once a year         Class 3 severe obesity due to excess calories with serious comorbidity in adult (Multi)    Skin rash     Secondary to allopurinol given patient hydroxyzine use and Kenalog injection 40 mg intramuscular right arm         Acute deep vein thrombosis (DVT) of left lower extremity (Multi)     Continue Xarelto monitor oxygen send bleeding         Pain of foot - Primary     Patient was evaluated today, problem list was reviewed, problems and concerns addressed, Rx list reviewed and updated, lab and tests were noted and reviewed. Life style changes were discussed, always it works better if we eat plant based diet and plenty of fibres and roughage. Consume adequate amount of water and avoid alcohol, light to moderate physical activities and stress reduction are always beneficial for ongoing physical well being. Do not forget to have 6 to 7 hours of sleep regularly and avoid late night christopher screen exposure.    HPI 73-year-old patient have a metabolic syndrome hypertension hyperlipidemia obesity recently  diagnosis of the DVT hypoxia evaluated by the pulmonary hematology cardiology service diagnosis of high uric acid given allopurinol developed maculopapular nonvesicular rash with the pruritus insomnia allopurinol discontinue given patient on hydroxyzine's and Benadryl doing reasonably well given Kenalog injection with moderate rash with insomnia    Patient recently had a wart removal from the left foot postprocedure the tingling numbness sharp shooting burning pain given the patient is a local care of locally    Moderate obesity associate with the symptoms advised 1800-calorie diet 10,000 steps a day Adipex discussed about short and long-term side effect on the heart kidney and brain.  Case discussed with the patient's son who accompanied with the patient  History reviewed. No pertinent past medical history.  Past Surgical History:   Procedure Laterality Date    TOTAL KNEE ARTHROPLASTY  03/22/2016    Knee Replacement     No Known Allergies  Current Outpatient Medications   Medication Sig Dispense Refill    atorvastatin (Lipitor) 10 mg tablet Take 1 tablet (10 mg) by mouth once daily. 90 tablet 3    hydrOXYzine HCL (Atarax) 25 mg tablet Take 1 tablet (25 mg) by mouth 2 times a day. 20 tablet 0    levothyroxine (Synthroid) 25 mcg tablet Take 1 tablet (25 mcg) by mouth early in the morning.. Take on an empty stomach at the same time each day, either 30 to 60 minutes prior to breakfast 90 tablet 3    Lopressor 50 mg tablet 0.5 tablets (25 mg).      olmesartan-hydrochlorothiazide (Benicar HCT) 40-25 mg tablet Take 1 tablet by mouth once daily. 90 tablet 3    pantoprazole (ProtoNix) 40 mg EC tablet Take 1 tablet (40 mg) by mouth early in the morning..      predniSONE (Deltasone) 5 mg tablet Take 1 tablet (5 mg) by mouth 2 times a day. 20 tablet 0    rivaroxaban (Xarelto) 15 mg tablet Take 1 tablet (15 mg) by mouth 2 times a day for 21 days. Take with food. 42 tablet 0     No current facility-administered medications for  this visit.     Family History   Problem Relation Name Age of Onset    No Known Problems Mother      No Known Problems Father       Social History     Socioeconomic History    Marital status:    Tobacco Use    Smoking status: Never    Smokeless tobacco: Never   Vaping Use    Vaping status: Never Used   Substance and Sexual Activity    Alcohol use: Never    Drug use: Never     Immunization History   Administered Date(s) Administered    Influenza, High Dose Seasonal, Preservative Free 09/30/2015, 10/05/2016    Influenza, Unspecified 10/05/2016    Influenza, seasonal, injectable 09/24/2014    Pneumococcal conjugate vaccine, 13-valent (PREVNAR 13) 09/30/2015    Pneumococcal polysaccharide vaccine, 23-valent, age 2 years and older (PNEUMOVAX 23) 12/20/2014    Td vaccine, age 7 years and older (TENIVAC) 04/04/2016       Review of Systems  Review of systems is otherwise negative unless stated above or in history of present illness.    Objective   Visit Vitals  /85   Pulse 56   Ht 1.524 m (5')   Wt 102 kg (224 lb)   SpO2 95%   BMI 43.75 kg/m²   Smoking Status Never   BSA 2.08 m²     Physical Exam  Constitutional: BMI 43 runny nose     General: not in acute distress.   HENT:      Head: Normocephalic and atraumatic.      Nose: Nose normal.   Eyes: Allergy conjunctivitis     Extraocular Movements: Extraocular movements intact.      Conjunctiva/sclera: Conjunctivae normal.   Cardiovascular:      Rate and Rhythm: Normal rate ,  No M/R/G  Pulmonary: Rhonchi     Effort: Pulmonary effort is normal.      Breath sounds: Normal, Bilat Equal AE  Skin: Phlebitis maculopapular nonvesicular rash affecting the face chest abdomen and both upper lower extremity sun exposed area     General: Skin is warm.   Neurological: Neurology     Mental Status: He is alert and oriented to person, place, and time.   Psychiatric: Anxiety   Mood and Affect: Mood normal.         Behavior: Behavior normal.   Musculoskeletal gouty arthritis  asymptomatic  FROM in all extremitirs,  Joint-no swelling or tenderness    Orders Only on 06/06/2024   Component Date Value Ref Range Status    [facility] Magnesium 06/06/2024 1.7  1.6 - 2.6 mg/dL Final    [facility] Uric Acid 06/06/2024 9.2 (H)  3.1 - 7.8 mg/dL Final    [facility] Glucose 06/06/2024 89  74 - 106 mg/dL Final    [facility] CO2, venous 06/06/2024 33.0 (H)  20.0 - 31.0 mmol/L Final    [facility] BUN/Creat Ratio 06/06/2024 20.9   Final    [facility] GFR AA 06/06/2024 58   Final    [facility] Chloride 06/06/2024 102  98 - 107 mmol/L Final    [facility] Calcium 06/06/2024 9.8  8.7 - 10.4 mg/dL Final    [facility] Glomerular Filtration R* 06/06/2024 48  mL/min/1.73m? Final    [facility] K 06/06/2024 4.9  3.5 - 5.1 mmol/L Final    [facility] Na 06/06/2024 141  135 - 145 mmol/L Final    [facility] BUN 06/06/2024 23  9 - 23 mg/dL Final    [facility] Creatinine 06/06/2024 1.1 (H)  0.5 - 0.8 mg/dL Final    [facility] Calculated Osmolality 06/06/2024 285  275 - 295 mOsm/kg Final   Lab on 05/14/2024   Component Date Value Ref Range Status    WBC 05/14/2024 8.9  4.4 - 11.3 x10*3/uL Final    nRBC 05/14/2024 0.0  0.0 - 0.0 /100 WBCs Final    RBC 05/14/2024 3.75 (L)  4.00 - 5.20 x10*6/uL Final    Hemoglobin 05/14/2024 11.4 (L)  12.0 - 16.0 g/dL Final    Hematocrit 05/14/2024 37.4  36.0 - 46.0 % Final    MCV 05/14/2024 100  80 - 100 fL Final    MCH 05/14/2024 30.4  26.0 - 34.0 pg Final    MCHC 05/14/2024 30.5 (L)  32.0 - 36.0 g/dL Final    RDW 05/14/2024 13.1  11.5 - 14.5 % Final    Platelets 05/14/2024 275  150 - 450 x10*3/uL Final    Neutrophils % 05/14/2024 71.4  40.0 - 80.0 % Final    Immature Granulocytes %, Automated 05/14/2024 0.6  0.0 - 0.9 % Final    Lymphocytes % 05/14/2024 18.5  13.0 - 44.0 % Final    Monocytes % 05/14/2024 8.1  2.0 - 10.0 % Final    Eosinophils % 05/14/2024 0.8  0.0 - 6.0 % Final    Basophils % 05/14/2024 0.6  0.0 - 2.0 % Final    Neutrophils Absolute 05/14/2024 6.35 (H)  1.60 - 5.50  x10*3/uL Final    Immature Granulocytes Absolute, Au* 05/14/2024 0.05  0.00 - 0.50 x10*3/uL Final    Lymphocytes Absolute 05/14/2024 1.64  0.80 - 3.00 x10*3/uL Final    Monocytes Absolute 05/14/2024 0.72  0.05 - 0.80 x10*3/uL Final    Eosinophils Absolute 05/14/2024 0.07  0.00 - 0.40 x10*3/uL Final    Basophils Absolute 05/14/2024 0.05  0.00 - 0.10 x10*3/uL Final    Glucose 05/14/2024 98  74 - 99 mg/dL Final    Sodium 05/14/2024 139  136 - 145 mmol/L Final    Potassium 05/14/2024 4.6  3.5 - 5.3 mmol/L Final    Chloride 05/14/2024 103  98 - 107 mmol/L Final    Bicarbonate 05/14/2024 27  21 - 32 mmol/L Final    Anion Gap 05/14/2024 14  10 - 20 mmol/L Final    Urea Nitrogen 05/14/2024 16  6 - 23 mg/dL Final    Creatinine 05/14/2024 0.99  0.50 - 1.05 mg/dL Final    eGFR 05/14/2024 60 (L)  >60 mL/min/1.73m*2 Final    Calcium 05/14/2024 9.6  8.6 - 10.6 mg/dL Final    Albumin 05/14/2024 4.1  3.4 - 5.0 g/dL Final    Alkaline Phosphatase 05/14/2024 62  33 - 136 U/L Final    Total Protein 05/14/2024 6.9  6.4 - 8.2 g/dL Final    AST 05/14/2024 13  9 - 39 U/L Final    Bilirubin, Total 05/14/2024 0.6  0.0 - 1.2 mg/dL Final    ALT 05/14/2024 6 (L)  7 - 45 U/L Final    Hemoglobin A1C 05/14/2024 5.0  see below % Final    Estimated Average Glucose 05/14/2024 97  Not Established mg/dL Final    Cholesterol 05/14/2024 183  0 - 199 mg/dL Final    HDL-Cholesterol 05/14/2024 80.0  mg/dL Final    Cholesterol/HDL Ratio 05/14/2024 2.3   Final    LDL Calculated 05/14/2024 81  <=99 mg/dL Final    VLDL 05/14/2024 22  0 - 40 mg/dL Final    Triglycerides 05/14/2024 111  0 - 149 mg/dL Final    Non HDL Cholesterol 05/14/2024 103  0 - 149 mg/dL Final    Thyroid Stimulating Hormone 05/14/2024 4.58 (H)  0.44 - 3.98 mIU/L Final    Magnesium 05/14/2024 1.99  1.60 - 2.40 mg/dL Final    BNP 05/14/2024 470 (H)  0 - 99 pg/mL Final    Thyroxine, Free 05/14/2024 1.18  0.78 - 1.48 ng/dL Final       Radiology: Reviewed imaging in powerchart.  XR chest 1  view    Result Date: 6/23/2024  * * *Final Report* * * DATE OF EXAM: Jun 23 2024  6:57PM   BRX   [id number]  -  XR CHEST 1V FRONTAL PORT  / ACCESSION #  [id number] PROCEDURE REASON: Shortness of breath      * * * * Physician Interpretation * * * *  EXAMINATION:  CHEST RADIOGRAPH (PORTABLE SINGLE VIEW AP) Exam Date/Time:  6/23/2024 6:57 PM CLINICAL HISTORY: Shortness of breath MQ:  XCPR_5 Comparison:  8/23/2011 RESULT: Lines, tubes, and devices:  None. Lungs and pleura:  No acute infiltrates are identified.  The LEFT lung difficult to evaluate due to the size of the heart Cardiomediastinal silhouette:  Stable cardiomediastinal silhouette. Other:  Bony structures unremarkable.    IMPRESSION: Findings as discussed under Results portion of report. : GAYLA   Transcribe Date/Time: Jun 23 2024  7:05P Dictated by : KRYS WALTON DO This examination was interpreted and the report reviewed and electronically signed by: KRYS WALTON DO on Jun 23 2024  7:07PM  EST        Charting was completed using voice recognition technology and may include unintended errors.

## 2024-07-16 NOTE — ASSESSMENT & PLAN NOTE
Secondary to allopurinol given patient hydroxyzine use and Kenalog injection 40 mg intramuscular right arm

## 2024-08-15 ENCOUNTER — OFFICE VISIT (OUTPATIENT)
Dept: PRIMARY CARE | Facility: CLINIC | Age: 74
End: 2024-08-15
Payer: COMMERCIAL

## 2024-08-15 VITALS
HEIGHT: 60 IN | DIASTOLIC BLOOD PRESSURE: 80 MMHG | SYSTOLIC BLOOD PRESSURE: 136 MMHG | OXYGEN SATURATION: 100 % | TEMPERATURE: 97 F | WEIGHT: 226 LBS | BODY MASS INDEX: 44.37 KG/M2 | HEART RATE: 67 BPM

## 2024-08-15 DIAGNOSIS — R21 SKIN RASH: ICD-10-CM

## 2024-08-15 DIAGNOSIS — N18.31 STAGE 3A CHRONIC KIDNEY DISEASE (MULTI): ICD-10-CM

## 2024-08-15 DIAGNOSIS — E66.01 CLASS 3 SEVERE OBESITY DUE TO EXCESS CALORIES WITH SERIOUS COMORBIDITY IN ADULT, UNSPECIFIED BMI (MULTI): ICD-10-CM

## 2024-08-15 DIAGNOSIS — M79.672 LEFT FOOT PAIN: ICD-10-CM

## 2024-08-15 DIAGNOSIS — G89.29 CHRONIC PAIN OF RIGHT KNEE: Primary | ICD-10-CM

## 2024-08-15 DIAGNOSIS — N39.0 UTI (URINARY TRACT INFECTION), UNCOMPLICATED: ICD-10-CM

## 2024-08-15 DIAGNOSIS — Z00.01 ANNUAL VISIT FOR GENERAL ADULT MEDICAL EXAMINATION WITH ABNORMAL FINDINGS: ICD-10-CM

## 2024-08-15 DIAGNOSIS — I10 BENIGN ESSENTIAL HYPERTENSION: ICD-10-CM

## 2024-08-15 DIAGNOSIS — E78.2 HYPERLIPEMIA, MIXED: ICD-10-CM

## 2024-08-15 DIAGNOSIS — I82.402 ACUTE DEEP VEIN THROMBOSIS (DVT) OF LEFT LOWER EXTREMITY, UNSPECIFIED VEIN (MULTI): ICD-10-CM

## 2024-08-15 DIAGNOSIS — M25.561 CHRONIC PAIN OF RIGHT KNEE: Primary | ICD-10-CM

## 2024-08-15 PROCEDURE — 99214 OFFICE O/P EST MOD 30 MIN: CPT | Performed by: INTERNAL MEDICINE

## 2024-08-15 PROCEDURE — 1160F RVW MEDS BY RX/DR IN RCRD: CPT | Performed by: INTERNAL MEDICINE

## 2024-08-15 PROCEDURE — 3075F SYST BP GE 130 - 139MM HG: CPT | Performed by: INTERNAL MEDICINE

## 2024-08-15 PROCEDURE — 1036F TOBACCO NON-USER: CPT | Performed by: INTERNAL MEDICINE

## 2024-08-15 PROCEDURE — 96372 THER/PROPH/DIAG INJ SC/IM: CPT | Performed by: INTERNAL MEDICINE

## 2024-08-15 PROCEDURE — 3079F DIAST BP 80-89 MM HG: CPT | Performed by: INTERNAL MEDICINE

## 2024-08-15 PROCEDURE — 1123F ACP DISCUSS/DSCN MKR DOCD: CPT | Performed by: INTERNAL MEDICINE

## 2024-08-15 PROCEDURE — 1159F MED LIST DOCD IN RCRD: CPT | Performed by: INTERNAL MEDICINE

## 2024-08-15 RX ORDER — TRIAMCINOLONE ACETONIDE 40 MG/ML
40 INJECTION, SUSPENSION INTRA-ARTICULAR; INTRAMUSCULAR ONCE
Status: COMPLETED | OUTPATIENT
Start: 2024-08-15 | End: 2024-08-15

## 2024-08-15 NOTE — PROGRESS NOTES
Subjective   Patient ID: Anna Freedman is a 73 y.o. female who presents for Follow-up (On medications ) and Annual Exam.    Assessment/Plan     Problem List Items Addressed This Visit       Benign essential hypertension     Patients BP readings reviewed and addressed, as we age our arteries turn stiffer and less elastic. Restricting salt consumption and staying physically fit with regular exercise regimen is the only way to keep our vasculature less tonic. Studies have shown that keeping ideal body wt, exercise routine about 140 to 150 minutes a week, eating variety of plant based diet and drinking plentiful water are quite helpful. Monitor BP twice or once a week at home and bring log to be reviewed by me. Uncontrolled BP has long term consequences including heart failure, myocardial infarction, accelerated atherosclerosis and kidney dysfunction. Therapy reviewed and explained.           Relevant Orders    Colonoscopy Screening; Average Risk Patient    Albumin-Creatinine Ratio, Urine Random    CBC and Auto Differential    Comprehensive Metabolic Panel    Hemoglobin A1C    Lipid Panel    Magnesium    TSH with reflex to Free T4 if abnormal    Uric Acid    Urine Culture    XR foot left 1-2 views    Hyperlipemia, mixed     Check CMP CPK lipid twice a year         Relevant Orders    Colonoscopy Screening; Average Risk Patient    Albumin-Creatinine Ratio, Urine Random    CBC and Auto Differential    Comprehensive Metabolic Panel    Hemoglobin A1C    Lipid Panel    Magnesium    TSH with reflex to Free T4 if abnormal    Uric Acid    Urine Culture    XR foot left 1-2 views    Class 3 severe obesity due to excess calories with serious comorbidity in adult (Multi)     Sleep apnea endocrine workup Adipex I personally reviewed the [facility] report for this patient. This report is scanned into the electronic medical record. I have considered  the risks of abuse, dependence, addiction and diversion. After discussion, patient does  have a good understanding of the safety and  risks of this medication. I believe that it is clinically appropriate for this patient to be prescribed this medication.  See patient back in 6 weeks.         Relevant Orders    Colonoscopy Screening; Average Risk Patient    Albumin-Creatinine Ratio, Urine Random    CBC and Auto Differential    Comprehensive Metabolic Panel    Hemoglobin A1C    Lipid Panel    Magnesium    TSH with reflex to Free T4 if abnormal    Uric Acid    Urine Culture    XR foot left 1-2 views    Chronic pain of right knee - Primary    Skin rash    Relevant Orders    Colonoscopy Screening; Average Risk Patient    Albumin-Creatinine Ratio, Urine Random    CBC and Auto Differential    Comprehensive Metabolic Panel    Hemoglobin A1C    Lipid Panel    Magnesium    TSH with reflex to Free T4 if abnormal    Uric Acid    Urine Culture    XR foot left 1-2 views    Acute deep vein thrombosis (DVT) of left lower extremity (Multi)     Continue Xarelto monitor oxygen and bleeding         Pain of foot     Acetaminophen every 8 hour sent for the x-ray refer to podiatry         Relevant Orders    Colonoscopy Screening; Average Risk Patient    Albumin-Creatinine Ratio, Urine Random    CBC and Auto Differential    Comprehensive Metabolic Panel    Hemoglobin A1C    Lipid Panel    Magnesium    TSH with reflex to Free T4 if abnormal    Uric Acid    Urine Culture    XR foot left 1-2 views     Other Visit Diagnoses       Stage 3a chronic kidney disease (Multi)        Relevant Orders    Colonoscopy Screening; Average Risk Patient    Albumin-Creatinine Ratio, Urine Random    CBC and Auto Differential    Comprehensive Metabolic Panel    Hemoglobin A1C    Lipid Panel    Magnesium    TSH with reflex to Free T4 if abnormal    Uric Acid    Urine Culture    XR foot left 1-2 views    UTI (urinary tract infection), uncomplicated        Relevant Orders    Colonoscopy Screening; Average Risk Patient    Albumin-Creatinine Ratio, Urine  Random    CBC and Auto Differential    Comprehensive Metabolic Panel    Hemoglobin A1C    Lipid Panel    Magnesium    TSH with reflex to Free T4 if abnormal    Uric Acid    Urine Culture    XR foot left 1-2 views          Patient was evaluated today, problem list was reviewed, problems and concerns addressed, Rx list reviewed and updated, lab and tests were noted and reviewed. Life style changes were discussed, always it works better if we eat plant based diet and plenty of fibres and roughage. Consume adequate amount of water and avoid alcohol, light to moderate physical activities and stress reduction are always beneficial for ongoing physical well being. Do not forget to have 6 to 7 hours of sleep regularly and avoid late night christopher screen exposure.    HPI 73-year-old patient have metabolic syndrome DVT SVT hypertension hyperlipidemia obesity moderate anxiety depression PTSD since the loss of the  complaining acute on chronic right knee pain onset gradual duration few weeks progress slowly aggravating factor obesity activity    Negative for fall injury    Negative for jaundice hematuria rectal        Negative for hypoxia or bleeding    Diagnosis of bursitis arthritis physical Occupational Therapy referral to Dr. Gao given Kenalog injection 40 without any complication    Hyperlipidemia Lipitor    Hypothyroid levothyroxine    SVT metoprolol    Proteinuria Benicar    Gastritis Protonix    DVT continue Xarelto monitor for bleeding and oxygen    Obesity 1800-calorie diet 10,000 stop and Adipex    Follow-up 4 weeks  History reviewed. No pertinent past medical history.  Past Surgical History:   Procedure Laterality Date    TOTAL KNEE ARTHROPLASTY  03/22/2016    Knee Replacement     No Known Allergies  Current Outpatient Medications   Medication Sig Dispense Refill    atorvastatin (Lipitor) 10 mg tablet Take 1 tablet (10 mg) by mouth once daily. 90 tablet 3    levothyroxine (Synthroid) 25 mcg tablet Take 1 tablet  (25 mcg) by mouth early in the morning.. Take on an empty stomach at the same time each day, either 30 to 60 minutes prior to breakfast 90 tablet 3    pantoprazole (ProtoNix) 40 mg EC tablet Take 1 tablet (40 mg) by mouth early in the morning..      phentermine (Adipex-P) 37.5 mg tablet Take 1 tablet (37.5 mg) by mouth once daily in the morning. Take before meals. 30 tablet 3    rivaroxaban (Xarelto) 15 mg tablet Take 1 tablet (15 mg) by mouth 2 times a day for 21 days. Take with food. 42 tablet 0    Lopressor 50 mg tablet 0.5 tablets (25 mg).      olmesartan-hydrochlorothiazide (Benicar HCT) 40-25 mg tablet Take 1 tablet by mouth once daily. (Patient not taking: Reported on 8/15/2024) 90 tablet 3     No current facility-administered medications for this visit.     Family History   Problem Relation Name Age of Onset    No Known Problems Mother      No Known Problems Father       Social History     Socioeconomic History    Marital status:    Tobacco Use    Smoking status: Never    Smokeless tobacco: Never   Vaping Use    Vaping status: Never Used   Substance and Sexual Activity    Alcohol use: Never    Drug use: Never     Immunization History   Administered Date(s) Administered    Flu vaccine, trivalent, preservative free, HIGH-DOSE, age 65y+ (Fluzone) 09/30/2015, 10/05/2016    Influenza, Unspecified 10/05/2016    Influenza, seasonal, injectable 09/24/2014    Pneumococcal conjugate vaccine, 13-valent (PREVNAR 13) 09/30/2015    Pneumococcal polysaccharide vaccine, 23-valent, age 2 years and older (PNEUMOVAX 23) 12/20/2014    Td vaccine, age 7 years and older (TENIVAC) 04/04/2016       Review of Systems  Review of systems is otherwise negative unless stated above or in history of present illness.    Objective   Visit Vitals  /80   Pulse 67   Temp 36.1 °C (97 °F)   Ht 1.524 m (5')   Wt 103 kg (226 lb)   SpO2 100%   BMI 44.14 kg/m²   Smoking Status Never   BSA 2.09 m²     Physical Exam  Constitutional: BMI  44     General: not in acute distress.   HENT:      Head: Normocephalic and atraumatic.      Nose: Nose normal.   Eyes: No jaundice     Extraocular Movements: Extraocular movements intact.      Conjunctiva/sclera: Conjunctivae normal.   Cardiovascular: Heart murmur ankle 2+ edema nonpitting     Rate and Rhythm: Normal rate ,  No M/R/G  Pulmonary:      Effort: Pulmonary effort is normal.      Breath sounds: Normal, Bilat Equal AE  Skin: Varicose vein with phlebitis     General: Skin is warm.   Neurological: Neuralgia     Mental Status: He is alert and oriented to person, place, and time.   Psychiatric:    PTSD     Mood and Affect: Mood normal.         Behavior: Behavior normal.   Musculoskeletal arthritis of knee and ankle  FROM in all extremitirs,  Joint-no swelling or tenderness    Orders Only on 06/06/2024   Component Date Value Ref Range Status    [facility] Magnesium 06/06/2024 1.7  1.6 - 2.6 mg/dL Final    [facility] Uric Acid 06/06/2024 9.2 (H)  3.1 - 7.8 mg/dL Final    [facility] Glucose 06/06/2024 89  74 - 106 mg/dL Final    [facility] CO2, venous 06/06/2024 33.0 (H)  20.0 - 31.0 mmol/L Final    [facility] BUN/Creat Ratio 06/06/2024 20.9   Final    [facility] GFR AA 06/06/2024 58   Final    [facility] Chloride 06/06/2024 102  98 - 107 mmol/L Final    [facility] Calcium 06/06/2024 9.8  8.7 - 10.4 mg/dL Final    [facility] Glomerular Filtration R* 06/06/2024 48  mL/min/1.73m? Final    [facility] K 06/06/2024 4.9  3.5 - 5.1 mmol/L Final    [facility] Na 06/06/2024 141  135 - 145 mmol/L Final    [facility] BUN 06/06/2024 23  9 - 23 mg/dL Final    [facility] Creatinine 06/06/2024 1.1 (H)  0.5 - 0.8 mg/dL Final    [facility] Calculated Osmolality 06/06/2024 285  275 - 295 mOsm/kg Final   Lab on 05/14/2024   Component Date Value Ref Range Status    WBC 05/14/2024 8.9  4.4 - 11.3 x10*3/uL Final    nRBC 05/14/2024 0.0  0.0 - 0.0 /100 WBCs Final    RBC 05/14/2024 3.75 (L)  4.00 - 5.20 x10*6/uL Final    Hemoglobin  05/14/2024 11.4 (L)  12.0 - 16.0 g/dL Final    Hematocrit 05/14/2024 37.4  36.0 - 46.0 % Final    MCV 05/14/2024 100  80 - 100 fL Final    MCH 05/14/2024 30.4  26.0 - 34.0 pg Final    MCHC 05/14/2024 30.5 (L)  32.0 - 36.0 g/dL Final    RDW 05/14/2024 13.1  11.5 - 14.5 % Final    Platelets 05/14/2024 275  150 - 450 x10*3/uL Final    Neutrophils % 05/14/2024 71.4  40.0 - 80.0 % Final    Immature Granulocytes %, Automated 05/14/2024 0.6  0.0 - 0.9 % Final    Lymphocytes % 05/14/2024 18.5  13.0 - 44.0 % Final    Monocytes % 05/14/2024 8.1  2.0 - 10.0 % Final    Eosinophils % 05/14/2024 0.8  0.0 - 6.0 % Final    Basophils % 05/14/2024 0.6  0.0 - 2.0 % Final    Neutrophils Absolute 05/14/2024 6.35 (H)  1.60 - 5.50 x10*3/uL Final    Immature Granulocytes Absolute, Au* 05/14/2024 0.05  0.00 - 0.50 x10*3/uL Final    Lymphocytes Absolute 05/14/2024 1.64  0.80 - 3.00 x10*3/uL Final    Monocytes Absolute 05/14/2024 0.72  0.05 - 0.80 x10*3/uL Final    Eosinophils Absolute 05/14/2024 0.07  0.00 - 0.40 x10*3/uL Final    Basophils Absolute 05/14/2024 0.05  0.00 - 0.10 x10*3/uL Final    Glucose 05/14/2024 98  74 - 99 mg/dL Final    Sodium 05/14/2024 139  136 - 145 mmol/L Final    Potassium 05/14/2024 4.6  3.5 - 5.3 mmol/L Final    Chloride 05/14/2024 103  98 - 107 mmol/L Final    Bicarbonate 05/14/2024 27  21 - 32 mmol/L Final    Anion Gap 05/14/2024 14  10 - 20 mmol/L Final    Urea Nitrogen 05/14/2024 16  6 - 23 mg/dL Final    Creatinine 05/14/2024 0.99  0.50 - 1.05 mg/dL Final    eGFR 05/14/2024 60 (L)  >60 mL/min/1.73m*2 Final    Calcium 05/14/2024 9.6  8.6 - 10.6 mg/dL Final    Albumin 05/14/2024 4.1  3.4 - 5.0 g/dL Final    Alkaline Phosphatase 05/14/2024 62  33 - 136 U/L Final    Total Protein 05/14/2024 6.9  6.4 - 8.2 g/dL Final    AST 05/14/2024 13  9 - 39 U/L Final    Bilirubin, Total 05/14/2024 0.6  0.0 - 1.2 mg/dL Final    ALT 05/14/2024 6 (L)  7 - 45 U/L Final    Hemoglobin A1C 05/14/2024 5.0  see below % Final     Estimated Average Glucose 05/14/2024 97  Not Established mg/dL Final    Cholesterol 05/14/2024 183  0 - 199 mg/dL Final    HDL-Cholesterol 05/14/2024 80.0  mg/dL Final    Cholesterol/HDL Ratio 05/14/2024 2.3   Final    LDL Calculated 05/14/2024 81  <=99 mg/dL Final    VLDL 05/14/2024 22  0 - 40 mg/dL Final    Triglycerides 05/14/2024 111  0 - 149 mg/dL Final    Non HDL Cholesterol 05/14/2024 103  0 - 149 mg/dL Final    Thyroid Stimulating Hormone 05/14/2024 4.58 (H)  0.44 - 3.98 mIU/L Final    Magnesium 05/14/2024 1.99  1.60 - 2.40 mg/dL Final    BNP 05/14/2024 470 (H)  0 - 99 pg/mL Final    Thyroxine, Free 05/14/2024 1.18  0.78 - 1.48 ng/dL Final       Radiology: Reviewed imaging in powerchart.  No results found.      Charting was completed using voice recognition technology and may include unintended errors.

## 2024-08-15 NOTE — ASSESSMENT & PLAN NOTE
Sleep apnea endocrine workup Adipex I personally reviewed the OARRS report for this patient. This report is scanned into the electronic medical record. I have considered  the risks of abuse, dependence, addiction and diversion. After discussion, patient does have a good understanding of the safety and  risks of this medication. I believe that it is clinically appropriate for this patient to be prescribed this medication.  See patient back in 6 weeks.

## 2024-08-16 ENCOUNTER — PATIENT OUTREACH (OUTPATIENT)
Dept: CARE COORDINATION | Facility: CLINIC | Age: 74
End: 2024-08-16
Payer: COMMERCIAL

## 2024-08-16 NOTE — PROGRESS NOTES
Call regarding appt. with PCP on 8/15/2024 after hospitalization.  At time of outreach call the patient feels as if their condition has improved since last visit.  Reviewed the PCP appointment with the pt and addressed any questions or concerns.  Spoke with son Herb, he states Anna is doing very well, no questions.

## 2024-08-22 ENCOUNTER — TELEPHONE (OUTPATIENT)
Dept: PRIMARY CARE | Facility: CLINIC | Age: 74
End: 2024-08-22
Payer: COMMERCIAL

## 2024-08-22 NOTE — TELEPHONE ENCOUNTER
Pt son states pt was in last week for swelling, but it has not gotten any better. Wondering when they can come back in to see the

## 2024-08-23 ENCOUNTER — OFFICE VISIT (OUTPATIENT)
Dept: PRIMARY CARE | Facility: CLINIC | Age: 74
End: 2024-08-23
Payer: COMMERCIAL

## 2024-08-23 VITALS
OXYGEN SATURATION: 98 % | TEMPERATURE: 97 F | WEIGHT: 223 LBS | BODY MASS INDEX: 43.78 KG/M2 | DIASTOLIC BLOOD PRESSURE: 79 MMHG | HEIGHT: 60 IN | SYSTOLIC BLOOD PRESSURE: 150 MMHG | HEART RATE: 68 BPM

## 2024-08-23 DIAGNOSIS — I10 BENIGN ESSENTIAL HYPERTENSION: ICD-10-CM

## 2024-08-23 DIAGNOSIS — E66.01 CLASS 3 SEVERE OBESITY DUE TO EXCESS CALORIES WITH SERIOUS COMORBIDITY IN ADULT, UNSPECIFIED BMI (MULTI): ICD-10-CM

## 2024-08-23 DIAGNOSIS — G89.29 CHRONIC PAIN OF RIGHT KNEE: ICD-10-CM

## 2024-08-23 DIAGNOSIS — I82.5Z2 CHRONIC DEEP VEIN THROMBOSIS (DVT) OF DISTAL VEIN OF LEFT LOWER EXTREMITY (MULTI): ICD-10-CM

## 2024-08-23 DIAGNOSIS — R60.1 GENERALIZED EDEMA: Primary | ICD-10-CM

## 2024-08-23 DIAGNOSIS — N18.31 STAGE 3A CHRONIC KIDNEY DISEASE (MULTI): ICD-10-CM

## 2024-08-23 DIAGNOSIS — M25.561 CHRONIC PAIN OF RIGHT KNEE: ICD-10-CM

## 2024-08-23 DIAGNOSIS — E78.2 HYPERLIPEMIA, MIXED: ICD-10-CM

## 2024-08-23 DIAGNOSIS — M19.90 ARTHRITIS: ICD-10-CM

## 2024-08-23 PROBLEM — N39.0 UTI (URINARY TRACT INFECTION), UNCOMPLICATED: Status: RESOLVED | Noted: 2024-08-15 | Resolved: 2024-08-23

## 2024-08-23 PROBLEM — Z00.01 ANNUAL VISIT FOR GENERAL ADULT MEDICAL EXAMINATION WITH ABNORMAL FINDINGS: Status: RESOLVED | Noted: 2023-08-11 | Resolved: 2024-08-23

## 2024-08-23 PROBLEM — I82.402 ACUTE DEEP VEIN THROMBOSIS (DVT) OF LEFT LOWER EXTREMITY (MULTI): Status: RESOLVED | Noted: 2024-07-09 | Resolved: 2024-08-23

## 2024-08-23 PROCEDURE — 99214 OFFICE O/P EST MOD 30 MIN: CPT | Performed by: INTERNAL MEDICINE

## 2024-08-23 PROCEDURE — 1159F MED LIST DOCD IN RCRD: CPT | Performed by: INTERNAL MEDICINE

## 2024-08-23 PROCEDURE — 3078F DIAST BP <80 MM HG: CPT | Performed by: INTERNAL MEDICINE

## 2024-08-23 PROCEDURE — 1160F RVW MEDS BY RX/DR IN RCRD: CPT | Performed by: INTERNAL MEDICINE

## 2024-08-23 PROCEDURE — 3077F SYST BP >= 140 MM HG: CPT | Performed by: INTERNAL MEDICINE

## 2024-08-23 PROCEDURE — 1123F ACP DISCUSS/DSCN MKR DOCD: CPT | Performed by: INTERNAL MEDICINE

## 2024-08-23 PROCEDURE — 1036F TOBACCO NON-USER: CPT | Performed by: INTERNAL MEDICINE

## 2024-08-23 RX ORDER — MELOXICAM 7.5 MG/1
7.5 TABLET ORAL DAILY
Qty: 30 TABLET | Refills: 3 | Status: SHIPPED | OUTPATIENT
Start: 2024-08-23

## 2024-08-23 RX ORDER — PHENTERMINE HYDROCHLORIDE 37.5 MG/1
37.5 TABLET ORAL
Qty: 30 TABLET | Refills: 0 | Status: SHIPPED | OUTPATIENT
Start: 2024-08-23

## 2024-08-23 RX ORDER — LOSARTAN POTASSIUM AND HYDROCHLOROTHIAZIDE 12.5; 5 MG/1; MG/1
1 TABLET ORAL DAILY
Qty: 90 TABLET | Refills: 3 | Status: SHIPPED | OUTPATIENT
Start: 2024-08-23 | End: 2025-08-23

## 2024-08-23 NOTE — ASSESSMENT & PLAN NOTE
I personally reviewed the OARRS report for this patient. This report is scanned into the electronic medical record. I have considered  the risks of abuse, dependence, addiction and diversion. After discussion, patient does have a good understanding of the safety and  risks of this medication. I believe that it is clinically appropriate for this patient to be prescribed this medication.  See patient back in 6 weeks.  Sent for sleep apnea test dietitian

## 2024-08-23 NOTE — PROGRESS NOTES
Subjective   Patient ID: Anna Freedman is a 73 y.o. female who presents for Edema (Legs, feet and face ).    Assessment/Plan     Problem List Items Addressed This Visit       Benign essential hypertension     Continue Lopressor 50 mg a day plus Hyzaar 50/12 point 5 in the morning BMP twice a year         Relevant Medications    losartan-hydrochlorothiazide (Hyzaar) 50-12.5 mg tablet    Other Relevant Orders    Magnesium    Uric Acid    TSH with reflex to Free T4 if abnormal    B-type natriuretic peptide    Hyperlipemia, mixed     Monitor CMP CPK lipid twice a year         Class 3 severe obesity due to excess calories with serious comorbidity in adult (Multi)     I personally reviewed the OARRS report for this patient. This report is scanned into the electronic medical record. I have considered  the risks of abuse, dependence, addiction and diversion. After discussion, patient does have a good understanding of the safety and  risks of this medication. I believe that it is clinically appropriate for this patient to be prescribed this medication.  See patient back in 6 weeks.  Sent for sleep apnea test dietitian         Relevant Medications    phentermine (Adipex-P) 37.5 mg tablet    Chronic pain of right knee     Physical therapy Occupational Therapy meloxicam 7.5 mg a day if not better see orthopedics for possible surgery right TKR         Relevant Medications    meloxicam (Mobic) 7.5 mg tablet    Other Relevant Orders    Magnesium    Uric Acid    TSH with reflex to Free T4 if abnormal    B-type natriuretic peptide    Stage 3a chronic kidney disease (Multi)     Continue Hyzaar monitor BMP twice a year         Chronic deep vein thrombosis (DVT) of distal vein of left lower extremity (Multi)     Continue Xarelto 50 mg a day monitor for the oxygen and bleeding         Arthritis    Relevant Orders    Magnesium    Uric Acid    TSH with reflex to Free T4 if abnormal    B-type natriuretic peptide    Generalized edema -  Primary     Patient was evaluated today, problem list was reviewed, problems and concerns addressed, Rx list reviewed and updated, lab and tests were noted and reviewed. Life style changes were discussed, always it works better if we eat plant based diet and plenty of fibres and roughage. Consume adequate amount of water and avoid alcohol, light to moderate physical activities and stress reduction are always beneficial for ongoing physical well being. Do not forget to have 6 to 7 hours of sleep regularly and avoid late night christopher screen exposure.    HPI 73-year-old patient have a metabolic syndrome obesity hyperglycemia hypertension hyperlipidemia hypothyroidism obesity gastritis complaining of the acute on chronic knee pain right knee also complaining of the edema around the face arms and legs with a 2 pound weight gain with uncontrolled hypertension at home    Negative for headache chest pain hematuria rectal bleeding because of the edema advised to discontinue amlodipine    Hyperlipidemia Lipitor hypothyroidism Synthroid palpitation Lopressor gastritis Protonix obesity 1800 Diet and Thursday per day Adipex DVT left leg continue Xarelto watch for bleeding    Hypertension with kidney disease given Hyzaar osteopenia osteoarthritis right knee given meloxicam physical Occupational Therapy refer patient to orthopedics follow-up Case discussed with the patient's son  History reviewed. No pertinent past medical history.  Past Surgical History:   Procedure Laterality Date    TOTAL KNEE ARTHROPLASTY  03/22/2016    Knee Replacement     No Known Allergies  Current Outpatient Medications   Medication Sig Dispense Refill    atorvastatin (Lipitor) 10 mg tablet Take 1 tablet (10 mg) by mouth once daily. 90 tablet 3    levothyroxine (Synthroid) 25 mcg tablet Take 1 tablet (25 mcg) by mouth early in the morning.. Take on an empty stomach at the same time each day, either 30 to 60 minutes prior to breakfast 90 tablet 3    Lopressor  50 mg tablet 0.5 tablets (25 mg).      pantoprazole (ProtoNix) 40 mg EC tablet Take 1 tablet (40 mg) by mouth early in the morning..      rivaroxaban (Xarelto) 15 mg tablet Take 1 tablet (15 mg) by mouth 2 times a day for 21 days. Take with food. 42 tablet 0    losartan-hydrochlorothiazide (Hyzaar) 50-12.5 mg tablet Take 1 tablet by mouth once daily. In the am 90 tablet 3    meloxicam (Mobic) 7.5 mg tablet Take 1 tablet (7.5 mg) by mouth once daily. 30 tablet 3    olmesartan-hydrochlorothiazide (Benicar HCT) 40-25 mg tablet Take 1 tablet by mouth once daily. (Patient not taking: Reported on 8/15/2024) 90 tablet 3    phentermine (Adipex-P) 37.5 mg tablet Take 1 tablet (37.5 mg) by mouth once daily in the morning. Take before meals. 30 tablet 0     No current facility-administered medications for this visit.     Family History   Problem Relation Name Age of Onset    No Known Problems Mother      No Known Problems Father       Social History     Socioeconomic History    Marital status:    Tobacco Use    Smoking status: Never    Smokeless tobacco: Never   Vaping Use    Vaping status: Never Used   Substance and Sexual Activity    Alcohol use: Never    Drug use: Never     Immunization History   Administered Date(s) Administered    Flu vaccine, trivalent, preservative free, HIGH-DOSE, age 65y+ (Fluzone) 09/30/2015, 10/05/2016    Influenza, Unspecified 10/05/2016    Influenza, seasonal, injectable 09/24/2014    Pneumococcal conjugate vaccine, 13-valent (PREVNAR 13) 09/30/2015    Pneumococcal polysaccharide vaccine, 23-valent, age 2 years and older (PNEUMOVAX 23) 12/20/2014    Td vaccine, age 7 years and older (TENIVAC) 04/04/2016       Review of Systems  Review of systems is otherwise negative unless stated above or in history of present illness.    Objective   Visit Vitals  /66   Pulse 68   Temp 36.1 °C (97 °F)   Ht 1.524 m (5')   Wt 101 kg (223 lb)   SpO2 98%   BMI 43.55 kg/m²   Smoking Status Never   BSA  2.07 m²     Physical Exam  Constitutional: Generalized edema     General: not in acute distress.   HENT: Runny nose     Head: Normocephalic and atraumatic.      Nose: Nose normal.   Eyes: Cataract     Extraocular Movements: Extraocular movements intact.      Conjunctiva/sclera: Conjunctivae normal.   Cardiovascular: 2+ ankle edema     Rate and Rhythm: Normal rate ,  No M/R/G  Pulmonary:      Effort: Pulmonary effort is normal.      Breath sounds: Normal, Bilat Equal AE  Skin: Dry skin     General: Skin is warm.   Neurological: Neuralgia     Mental Status: He is alert and oriented to person, place, and time.   Psychiatric:         Mood and Affect: Mood normal.         Behavior: Behavior normal.   Musculoskeletal moderate arthritis of the both knee  FROM in all extremitirs,  Joint-no swelling or tenderness    Orders Only on 06/06/2024   Component Date Value Ref Range Status    [facility] Magnesium 06/06/2024 1.7  1.6 - 2.6 mg/dL Final    [facility] Uric Acid 06/06/2024 9.2 (H)  3.1 - 7.8 mg/dL Final    [facility] Glucose 06/06/2024 89  74 - 106 mg/dL Final    [facility] CO2, venous 06/06/2024 33.0 (H)  20.0 - 31.0 mmol/L Final    [facility] BUN/Creat Ratio 06/06/2024 20.9   Final    [facility] GFR AA 06/06/2024 58   Final    [facility] Chloride 06/06/2024 102  98 - 107 mmol/L Final    [facility] Calcium 06/06/2024 9.8  8.7 - 10.4 mg/dL Final    [facility] Glomerular Filtration R* 06/06/2024 48  mL/min/1.73m? Final    [facility] K 06/06/2024 4.9  3.5 - 5.1 mmol/L Final    [facility] Na 06/06/2024 141  135 - 145 mmol/L Final    [facility] BUN 06/06/2024 23  9 - 23 mg/dL Final    [facility] Creatinine 06/06/2024 1.1 (H)  0.5 - 0.8 mg/dL Final    [facility] Calculated Osmolality 06/06/2024 285  275 - 295 mOsm/kg Final   Lab on 05/14/2024   Component Date Value Ref Range Status    WBC 05/14/2024 8.9  4.4 - 11.3 x10*3/uL Final    nRBC 05/14/2024 0.0  0.0 - 0.0 /100 WBCs Final    RBC 05/14/2024 3.75 (L)  4.00 - 5.20 x10*6/uL  Final    Hemoglobin 05/14/2024 11.4 (L)  12.0 - 16.0 g/dL Final    Hematocrit 05/14/2024 37.4  36.0 - 46.0 % Final    MCV 05/14/2024 100  80 - 100 fL Final    MCH 05/14/2024 30.4  26.0 - 34.0 pg Final    MCHC 05/14/2024 30.5 (L)  32.0 - 36.0 g/dL Final    RDW 05/14/2024 13.1  11.5 - 14.5 % Final    Platelets 05/14/2024 275  150 - 450 x10*3/uL Final    Neutrophils % 05/14/2024 71.4  40.0 - 80.0 % Final    Immature Granulocytes %, Automated 05/14/2024 0.6  0.0 - 0.9 % Final    Lymphocytes % 05/14/2024 18.5  13.0 - 44.0 % Final    Monocytes % 05/14/2024 8.1  2.0 - 10.0 % Final    Eosinophils % 05/14/2024 0.8  0.0 - 6.0 % Final    Basophils % 05/14/2024 0.6  0.0 - 2.0 % Final    Neutrophils Absolute 05/14/2024 6.35 (H)  1.60 - 5.50 x10*3/uL Final    Immature Granulocytes Absolute, Au* 05/14/2024 0.05  0.00 - 0.50 x10*3/uL Final    Lymphocytes Absolute 05/14/2024 1.64  0.80 - 3.00 x10*3/uL Final    Monocytes Absolute 05/14/2024 0.72  0.05 - 0.80 x10*3/uL Final    Eosinophils Absolute 05/14/2024 0.07  0.00 - 0.40 x10*3/uL Final    Basophils Absolute 05/14/2024 0.05  0.00 - 0.10 x10*3/uL Final    Glucose 05/14/2024 98  74 - 99 mg/dL Final    Sodium 05/14/2024 139  136 - 145 mmol/L Final    Potassium 05/14/2024 4.6  3.5 - 5.3 mmol/L Final    Chloride 05/14/2024 103  98 - 107 mmol/L Final    Bicarbonate 05/14/2024 27  21 - 32 mmol/L Final    Anion Gap 05/14/2024 14  10 - 20 mmol/L Final    Urea Nitrogen 05/14/2024 16  6 - 23 mg/dL Final    Creatinine 05/14/2024 0.99  0.50 - 1.05 mg/dL Final    eGFR 05/14/2024 60 (L)  >60 mL/min/1.73m*2 Final    Calcium 05/14/2024 9.6  8.6 - 10.6 mg/dL Final    Albumin 05/14/2024 4.1  3.4 - 5.0 g/dL Final    Alkaline Phosphatase 05/14/2024 62  33 - 136 U/L Final    Total Protein 05/14/2024 6.9  6.4 - 8.2 g/dL Final    AST 05/14/2024 13  9 - 39 U/L Final    Bilirubin, Total 05/14/2024 0.6  0.0 - 1.2 mg/dL Final    ALT 05/14/2024 6 (L)  7 - 45 U/L Final    Hemoglobin A1C 05/14/2024 5.0  see  below % Final    Estimated Average Glucose 05/14/2024 97  Not Established mg/dL Final    Cholesterol 05/14/2024 183  0 - 199 mg/dL Final    HDL-Cholesterol 05/14/2024 80.0  mg/dL Final    Cholesterol/HDL Ratio 05/14/2024 2.3   Final    LDL Calculated 05/14/2024 81  <=99 mg/dL Final    VLDL 05/14/2024 22  0 - 40 mg/dL Final    Triglycerides 05/14/2024 111  0 - 149 mg/dL Final    Non HDL Cholesterol 05/14/2024 103  0 - 149 mg/dL Final    Thyroid Stimulating Hormone 05/14/2024 4.58 (H)  0.44 - 3.98 mIU/L Final    Magnesium 05/14/2024 1.99  1.60 - 2.40 mg/dL Final    BNP 05/14/2024 470 (H)  0 - 99 pg/mL Final    Thyroxine, Free 05/14/2024 1.18  0.78 - 1.48 ng/dL Final       Radiology: Reviewed imaging in powerchart.  No results found.      Charting was completed using voice recognition technology and may include unintended errors.

## 2024-08-23 NOTE — ASSESSMENT & PLAN NOTE
Physical therapy Occupational Therapy meloxicam 7.5 mg a day if not better see orthopedics for possible surgery right TKR

## 2024-08-27 ENCOUNTER — TELEPHONE (OUTPATIENT)
Dept: PRIMARY CARE | Facility: CLINIC | Age: 74
End: 2024-08-27
Payer: COMMERCIAL

## 2024-08-27 DIAGNOSIS — I82.402 ACUTE DEEP VEIN THROMBOSIS (DVT) OF LEFT LOWER EXTREMITY, UNSPECIFIED VEIN (MULTI): ICD-10-CM

## 2024-09-19 ENCOUNTER — OFFICE VISIT (OUTPATIENT)
Dept: PRIMARY CARE | Facility: CLINIC | Age: 74
End: 2024-09-19

## 2024-09-19 VITALS
HEART RATE: 50 BPM | HEIGHT: 60 IN | SYSTOLIC BLOOD PRESSURE: 110 MMHG | TEMPERATURE: 97.4 F | WEIGHT: 205 LBS | BODY MASS INDEX: 40.25 KG/M2 | DIASTOLIC BLOOD PRESSURE: 60 MMHG

## 2024-09-19 DIAGNOSIS — E78.2 HYPERLIPEMIA, MIXED: ICD-10-CM

## 2024-09-19 DIAGNOSIS — M25.511 ACUTE PAIN OF RIGHT SHOULDER: Primary | ICD-10-CM

## 2024-09-19 DIAGNOSIS — M19.90 ARTHRITIS: ICD-10-CM

## 2024-09-19 DIAGNOSIS — I10 BENIGN ESSENTIAL HYPERTENSION: ICD-10-CM

## 2024-09-19 DIAGNOSIS — M79.673 PAIN OF FOOT, UNSPECIFIED LATERALITY: ICD-10-CM

## 2024-09-19 DIAGNOSIS — B07.9 VIRAL WARTS, UNSPECIFIED TYPE: ICD-10-CM

## 2024-09-19 DIAGNOSIS — I50.1 LVF (LEFT VENTRICULAR FAILURE) (MULTI): ICD-10-CM

## 2024-09-19 DIAGNOSIS — R60.1 GENERALIZED EDEMA: ICD-10-CM

## 2024-09-19 DIAGNOSIS — I82.5Z2 CHRONIC DEEP VEIN THROMBOSIS (DVT) OF DISTAL VEIN OF LEFT LOWER EXTREMITY (MULTI): ICD-10-CM

## 2024-09-19 DIAGNOSIS — R60.9 EDEMA, UNSPECIFIED TYPE: ICD-10-CM

## 2024-09-19 DIAGNOSIS — E66.01 CLASS 3 SEVERE OBESITY DUE TO EXCESS CALORIES WITH SERIOUS COMORBIDITY IN ADULT, UNSPECIFIED BMI: ICD-10-CM

## 2024-09-19 PROBLEM — G89.29 CHRONIC PAIN OF RIGHT KNEE: Status: RESOLVED | Noted: 2023-07-18 | Resolved: 2024-09-19

## 2024-09-19 PROBLEM — N18.31 STAGE 3A CHRONIC KIDNEY DISEASE (MULTI): Status: RESOLVED | Noted: 2024-08-15 | Resolved: 2024-09-19

## 2024-09-19 PROBLEM — R21 SKIN RASH: Status: RESOLVED | Noted: 2024-07-09 | Resolved: 2024-09-19

## 2024-09-19 PROBLEM — M25.561 CHRONIC PAIN OF RIGHT KNEE: Status: RESOLVED | Noted: 2023-07-18 | Resolved: 2024-09-19

## 2024-09-19 PROCEDURE — 96372 THER/PROPH/DIAG INJ SC/IM: CPT | Performed by: INTERNAL MEDICINE

## 2024-09-19 PROCEDURE — 1036F TOBACCO NON-USER: CPT | Performed by: INTERNAL MEDICINE

## 2024-09-19 PROCEDURE — 99214 OFFICE O/P EST MOD 30 MIN: CPT | Performed by: INTERNAL MEDICINE

## 2024-09-19 PROCEDURE — 1160F RVW MEDS BY RX/DR IN RCRD: CPT | Performed by: INTERNAL MEDICINE

## 2024-09-19 PROCEDURE — 1123F ACP DISCUSS/DSCN MKR DOCD: CPT | Performed by: INTERNAL MEDICINE

## 2024-09-19 PROCEDURE — G2211 COMPLEX E/M VISIT ADD ON: HCPCS | Performed by: INTERNAL MEDICINE

## 2024-09-19 PROCEDURE — 3078F DIAST BP <80 MM HG: CPT | Performed by: INTERNAL MEDICINE

## 2024-09-19 PROCEDURE — 1159F MED LIST DOCD IN RCRD: CPT | Performed by: INTERNAL MEDICINE

## 2024-09-19 PROCEDURE — 3074F SYST BP LT 130 MM HG: CPT | Performed by: INTERNAL MEDICINE

## 2024-09-19 RX ORDER — FUROSEMIDE 10 MG/ML
40 INJECTION INTRAMUSCULAR; INTRAVENOUS ONCE
Status: COMPLETED | OUTPATIENT
Start: 2024-09-19 | End: 2024-09-19

## 2024-09-19 RX ORDER — TRIAMCINOLONE ACETONIDE 40 MG/ML
40 INJECTION, SUSPENSION INTRA-ARTICULAR; INTRAMUSCULAR ONCE
Status: COMPLETED | OUTPATIENT
Start: 2024-09-19 | End: 2024-09-19

## 2024-09-19 NOTE — PROGRESS NOTES
Subjective   Patient ID: Anna Freedman is a 73 y.o. female who presents for Arm Pain (Right ) and Edema (Both feet ).    Assessment/Plan     Problem List Items Addressed This Visit       Benign essential hypertension     Unfortunately patient was taking Benicar and losartan together advised discontinue losartan just continue Benicar only recheck your blood pressure         Hyperlipemia, mixed     Check CPK         Class 3 severe obesity due to excess calories with serious comorbidity in adult (Multi)     1800-calorie diet 10,000 steps a day Adipex I personally reviewed the OARRS report for this patient. This report is scanned into the electronic medical record. I have considered  the risks of abuse, dependence, addiction and diversion. After discussion, patient does have a good understanding of the safety and  risks of this medication. I believe that it is clinically appropriate for this patient to be prescribed this medication.  See patient back in 6 weeks.         Pain of foot     Plantar wart seen by podiatrist no better advised use the wart removal plus Epsom salt         Chronic deep vein thrombosis (DVT) of distal vein of left lower extremity (Multi)     Continue Xarelto monitor oxygen leg pain and bleeding         Arthritis     Arthritis of shoulder given Kenalog 40 mg shot without complication to the right shoulder         Relevant Medications    triamcinolone acetonide (Kenalog-40) injection 40 mg (Start on 9/19/2024 12:30 PM)    Edema     Given Lasix 40 mg intramuscular potassium magnesium supplement check electrolytes         Relevant Medications    furosemide (Lasix) injection 40 mg (Completed) (Start on 9/19/2024 12:30 PM)    LVF (left ventricular failure) (Multi)    Relevant Orders    Transthoracic echo (TTE) complete    Acute pain of right shoulder - Primary    Viral warts     Patient was evaluated today, problem list was reviewed, problems and concerns addressed, Rx list reviewed and updated, lab  and tests were noted and reviewed. Life style changes were discussed, always it works better if we eat plant based diet and plenty of fibres and roughage. Consume adequate amount of water and avoid alcohol, light to moderate physical activities and stress reduction are always beneficial for ongoing physical well being. Do not forget to have 6 to 7 hours of sleep regularly and avoid late night christopher screen exposure.    HPI 73-year-old patient who had a history of obesity hypertension hyperlipidemia hypothyroidism hypoglycemia DVT on Xarelto complaining of the edema both lower extremity onset gradual duration few weeks to months progress slowly aggravated by the obesity sleep apnea and possible sitting on the chair for a long time    Also complaining of acute on chronic right shoulder pain aggravated by activity relieved with the rest associated with the diuretics possible osteoarthritis or pseudogout arthritis    Unfortunately patient were taking Hyzaar and Benicar together advised discontinue Hyzaar continue only Benicar    Patient had a moderate obesity diet exercise with Adipex successfully lost 10 pound weight    Sent for the x-ray uric acid potassium magnesium checkup patient taking Xarelto monitor patient oxygen and bleeding    Patient planning to go [place] make sure continue hydration and anticoagulation while traveling for the plane    Negative for nausea vomiting diarrhea constipation    Negative for headache chest pain or hematuria    Negative for suicide  History reviewed. No pertinent past medical history.  Past Surgical History:   Procedure Laterality Date    TOTAL KNEE ARTHROPLASTY  03/22/2016    Knee Replacement     No Known Allergies  Current Outpatient Medications   Medication Sig Dispense Refill    atorvastatin (Lipitor) 10 mg tablet Take 1 tablet (10 mg) by mouth once daily. 90 tablet 3    levothyroxine (Synthroid) 25 mcg tablet Take 1 tablet (25 mcg) by mouth early in the morning.. Take on an empty  stomach at the same time each day, either 30 to 60 minutes prior to breakfast 90 tablet 3    losartan-hydrochlorothiazide (Hyzaar) 50-12.5 mg tablet Take 1 tablet by mouth once daily. In the am 90 tablet 3    meloxicam (Mobic) 7.5 mg tablet Take 1 tablet (7.5 mg) by mouth once daily. 30 tablet 3    olmesartan-hydrochlorothiazide (Benicar HCT) 40-25 mg tablet Take 1 tablet by mouth once daily. 90 tablet 3    pantoprazole (ProtoNix) 40 mg EC tablet Take 1 tablet (40 mg) by mouth early in the morning..      phentermine (Adipex-P) 37.5 mg tablet Take 1 tablet (37.5 mg) by mouth once daily in the morning. Take before meals. 30 tablet 0    rivaroxaban (Xarelto) 15 mg tablet Take 1 tablet (15 mg) by mouth 2 times a day. Take with food. 60 tablet 5     Current Facility-Administered Medications   Medication Dose Route Frequency Provider Last Rate Last Admin    triamcinolone acetonide (Kenalog-40) injection 40 mg  40 mg intramuscular Once Mari Gooden MD         Family History   Problem Relation Name Age of Onset    No Known Problems Mother      No Known Problems Father       Social History     Socioeconomic History    Marital status:    Tobacco Use    Smoking status: Never    Smokeless tobacco: Never   Vaping Use    Vaping status: Never Used   Substance and Sexual Activity    Alcohol use: Never    Drug use: Never     Immunization History   Administered Date(s) Administered    Flu vaccine, trivalent, preservative free, HIGH-DOSE, age 65y+ (Fluzone) 09/30/2015, 10/05/2016    Influenza, Unspecified 10/05/2016    Influenza, seasonal, injectable 09/24/2014    Pneumococcal conjugate vaccine, 13-valent (PREVNAR 13) 09/30/2015    Pneumococcal polysaccharide vaccine, 23-valent, age 2 years and older (PNEUMOVAX 23) 12/20/2014    Td vaccine, age 7 years and older (TENIVAC) 04/04/2016       Review of Systems  Review of systems is otherwise negative unless stated above or in history of present illness.    Objective   Visit  Vitals  /60   Pulse 50   Temp 36.3 °C (97.4 °F)   Ht 1.524 m (5')   Wt 93 kg (205 lb)   BMI 40.04 kg/m²   Smoking Status Never   BSA 1.98 m²     Physical Exam  Constitutional: BMI 40     General: not in acute distress.   HENT: Runny nose     Head: Normocephalic and atraumatic.      Nose: Nose normal.   Eyes: Mild cataract     Extraocular Movements: Extraocular movements intact.      Conjunctiva/sclera: Conjunctivae normal.   Cardiovascular: S1-S2 S4 with 2+ ankle edema pitting     Rate and Rhythm: Normal rate ,  No M/R/G  Pulmonary: Crackles     Effort: Pulmonary effort is normal.      Breath sounds: Normal, Bilat Equal AE  Skin: Varicose vein phlebitis lower extremity plantar warts bilaterally     General: Skin is warm.   Neurological: Chemical neuropathy     Mental Status: He is alert and oriented to person, place, and time.   Psychiatric:   PTSD loss     Mood and Affect: Mood normal.         Behavior: Behavior normal.   Musculoskeletal osteoarthritis gouty arthritis affecting the right shoulder  FROM in all extremitirs,  Joint-no swelling or tenderness    Orders Only on 06/06/2024   Component Date Value Ref Range Status    [facility] Magnesium 06/06/2024 1.7  1.6 - 2.6 mg/dL Final    [facility] Uric Acid 06/06/2024 9.2 (H)  3.1 - 7.8 mg/dL Final    [facility] Glucose 06/06/2024 89  74 - 106 mg/dL Final    [facility] CO2, venous 06/06/2024 33.0 (H)  20.0 - 31.0 mmol/L Final    [facility] BUN/Creat Ratio 06/06/2024 20.9   Final    [facility] GFR AA 06/06/2024 58   Final    [facility] Chloride 06/06/2024 102  98 - 107 mmol/L Final    [facility] Calcium 06/06/2024 9.8  8.7 - 10.4 mg/dL Final    [facility] Glomerular Filtration R* 06/06/2024 48  mL/min/1.73m? Final    [facility] K 06/06/2024 4.9  3.5 - 5.1 mmol/L Final    [facility] Na 06/06/2024 141  135 - 145 mmol/L Final    [facility] BUN 06/06/2024 23  9 - 23 mg/dL Final    [facility] Creatinine 06/06/2024 1.1 (H)  0.5 - 0.8 mg/dL Final    [facility]  Calculated Osmolality 06/06/2024 285  275 - 295 mOsm/kg Final       Radiology: Reviewed imaging in powerchart.  XR knee right 4+ views    Result Date: 9/3/2024  * * *Final Report* * * DATE OF EXAM: Sep  3 2024  8:47AM   M2X   [id number]  -  XR KNEE 4V AP/PA BOTH+LAT/ANGELO RT  / ACCESSION #  [id number] PROCEDURE REASON: Pain      * * * * Physician Interpretation * * * *  X-RAYS RIGHT KNEE HISTORY:   pain.   Pain TECHNIQUE: 4 views of the right knee. COMPARISON:  09/20/2019 x-rays RESULT: Tricompartmental osteoarthritis right knee, severe in the medial compartment bone-on-bone articulation.  There is lateral translation of the tibia.  There are tricompartmental osteophytes.  No fracture or significant joint effusion. Left total knee arthroplasty is present.    IMPRESSION: Osteoarthritis right knee, severe in the medial compartment. : GAYLA   Transcribe Date/Time: Sep  3 2024  9:43A Dictated by : DINESH JOSE MD This examination was interpreted and the report reviewed and electronically signed by: DINESH JOSE MD on Sep  3 2024  9:43AM  EST        Charting was completed using voice recognition technology and may include unintended errors.

## 2024-09-19 NOTE — ASSESSMENT & PLAN NOTE
1800-calorie diet 10,000 steps a day Adipex I personally reviewed the OARRS report for this patient. This report is scanned into the electronic medical record. I have considered  the risks of abuse, dependence, addiction and diversion. After discussion, patient does have a good understanding of the safety and  risks of this medication. I believe that it is clinically appropriate for this patient to be prescribed this medication.  See patient back in 6 weeks.

## 2024-09-19 NOTE — ASSESSMENT & PLAN NOTE
Unfortunately patient was taking Benicar and losartan together advised discontinue losartan just continue Benicar only recheck your blood pressure

## 2024-09-23 ENCOUNTER — APPOINTMENT (OUTPATIENT)
Dept: PRIMARY CARE | Facility: CLINIC | Age: 74
End: 2024-09-23

## 2024-09-24 ENCOUNTER — PATIENT OUTREACH (OUTPATIENT)
Dept: PRIMARY CARE | Facility: CLINIC | Age: 74
End: 2024-09-24

## 2024-09-27 ENCOUNTER — APPOINTMENT (OUTPATIENT)
Dept: PRIMARY CARE | Facility: CLINIC | Age: 74
End: 2024-09-27
Payer: COMMERCIAL

## 2025-04-07 ENCOUNTER — TELEPHONE (OUTPATIENT)
Dept: GASTROENTEROLOGY | Facility: CLINIC | Age: 75
End: 2025-04-07

## 2025-04-07 NOTE — TELEPHONE ENCOUNTER
Left voicemail on home number requesting that patient call in to schedule colon consult due to age.  Order for colonoscopy in system dated August of 2024.